# Patient Record
Sex: FEMALE | Race: ASIAN | NOT HISPANIC OR LATINO | URBAN - METROPOLITAN AREA
[De-identification: names, ages, dates, MRNs, and addresses within clinical notes are randomized per-mention and may not be internally consistent; named-entity substitution may affect disease eponyms.]

---

## 2023-09-05 ENCOUNTER — APPOINTMENT (OUTPATIENT)
Dept: RADIOLOGY | Facility: MEDICAL CENTER | Age: 43
DRG: 915 | End: 2023-09-05
Attending: STUDENT IN AN ORGANIZED HEALTH CARE EDUCATION/TRAINING PROGRAM
Payer: COMMERCIAL

## 2023-09-05 ENCOUNTER — HOSPITAL ENCOUNTER (INPATIENT)
Facility: MEDICAL CENTER | Age: 43
LOS: 2 days | DRG: 915 | End: 2023-09-07
Attending: STUDENT IN AN ORGANIZED HEALTH CARE EDUCATION/TRAINING PROGRAM | Admitting: INTERNAL MEDICINE
Payer: COMMERCIAL

## 2023-09-05 DIAGNOSIS — J96.00 ACUTE RESPIRATORY FAILURE, UNSPECIFIED WHETHER WITH HYPOXIA OR HYPERCAPNIA (HCC): ICD-10-CM

## 2023-09-05 DIAGNOSIS — T78.2XXA ANAPHYLAXIS, INITIAL ENCOUNTER: ICD-10-CM

## 2023-09-05 DIAGNOSIS — T78.2XXA ANAPHYLACTIC SHOCK: ICD-10-CM

## 2023-09-05 DIAGNOSIS — T78.09XA ANAPHYLACTIC REACTION DUE TO OTHER FOOD PRODUCTS, INITIAL ENCOUNTER: ICD-10-CM

## 2023-09-05 PROBLEM — F14.10 COCAINE ABUSE (HCC): Status: ACTIVE | Noted: 2023-09-05

## 2023-09-05 PROBLEM — J96.01 ACUTE RESPIRATORY FAILURE WITH HYPOXIA (HCC): Status: ACTIVE | Noted: 2023-09-05

## 2023-09-05 PROBLEM — R82.5 POSITIVE URINE DRUG SCREEN: Status: ACTIVE | Noted: 2023-09-05

## 2023-09-05 PROBLEM — E83.42 HYPOMAGNESEMIA: Status: ACTIVE | Noted: 2023-09-05

## 2023-09-05 PROBLEM — E87.6 HYPOKALEMIA: Status: ACTIVE | Noted: 2023-09-05

## 2023-09-05 LAB
ALBUMIN SERPL BCP-MCNC: 3.6 G/DL (ref 3.2–4.9)
ALBUMIN/GLOB SERPL: 1.4 G/DL
ALP SERPL-CCNC: 50 U/L (ref 30–99)
ALT SERPL-CCNC: 8 U/L (ref 2–50)
AMPHET UR QL SCN: NEGATIVE
ANION GAP SERPL CALC-SCNC: 10 MMOL/L (ref 7–16)
ARTERIAL PATENCY WRIST A: ABNORMAL
AST SERPL-CCNC: 17 U/L (ref 12–45)
BARBITURATES UR QL SCN: NEGATIVE
BASE EXCESS BLDA CALC-SCNC: -2 MMOL/L (ref -4–3)
BASE EXCESS BLDA CALC-SCNC: -2 MMOL/L (ref -4–3)
BASOPHILS # BLD AUTO: 0.1 % (ref 0–1.8)
BASOPHILS # BLD: 0.01 K/UL (ref 0–0.12)
BENZODIAZ UR QL SCN: POSITIVE
BILIRUB SERPL-MCNC: 0.6 MG/DL (ref 0.1–1.5)
BODY TEMPERATURE: ABNORMAL DEGREES
BODY TEMPERATURE: ABNORMAL DEGREES
BREATHS SETTING VENT: 22
BREATHS SETTING VENT: 22
BUN SERPL-MCNC: 8 MG/DL (ref 8–22)
BZE UR QL SCN: POSITIVE
CALCIUM ALBUM COR SERPL-MCNC: 8 MG/DL (ref 8.5–10.5)
CALCIUM SERPL-MCNC: 7.7 MG/DL (ref 8.5–10.5)
CANNABINOIDS UR QL SCN: POSITIVE
CHLORIDE SERPL-SCNC: 107 MMOL/L (ref 96–112)
CO2 BLDA-SCNC: 22 MMOL/L (ref 20–33)
CO2 BLDA-SCNC: 25 MMOL/L (ref 20–33)
CO2 SERPL-SCNC: 22 MMOL/L (ref 20–33)
CREAT SERPL-MCNC: 0.65 MG/DL (ref 0.5–1.4)
DELSYS IDSYS: ABNORMAL
DELSYS IDSYS: ABNORMAL
END TIDAL CARBON DIOXIDE IECO2: 27 MMHG
EOSINOPHIL # BLD AUTO: 0 K/UL (ref 0–0.51)
EOSINOPHIL NFR BLD: 0 % (ref 0–6.9)
ERYTHROCYTE [DISTWIDTH] IN BLOOD BY AUTOMATED COUNT: 46.2 FL (ref 35.9–50)
ETHANOL BLD-MCNC: <10.1 MG/DL
FENTANYL UR QL: POSITIVE
GFR SERPLBLD CREATININE-BSD FMLA CKD-EPI: 113 ML/MIN/1.73 M 2
GLOBULIN SER CALC-MCNC: 2.5 G/DL (ref 1.9–3.5)
GLUCOSE SERPL-MCNC: 137 MG/DL (ref 65–99)
HCG SERPL QL: NEGATIVE
HCO3 BLDA-SCNC: 21.1 MMOL/L (ref 17–25)
HCO3 BLDA-SCNC: 23.8 MMOL/L (ref 17–25)
HCT VFR BLD AUTO: 34.5 % (ref 37–47)
HGB BLD-MCNC: 11.9 G/DL (ref 12–16)
HOROWITZ INDEX BLDA+IHG-RTO: 353 MM[HG]
HOROWITZ INDEX BLDA+IHG-RTO: 433 MM[HG]
IMM GRANULOCYTES # BLD AUTO: 0.03 K/UL (ref 0–0.11)
IMM GRANULOCYTES NFR BLD AUTO: 0.3 % (ref 0–0.9)
LYMPHOCYTES # BLD AUTO: 0.31 K/UL (ref 1–4.8)
LYMPHOCYTES NFR BLD: 3.2 % (ref 22–41)
MAGNESIUM SERPL-MCNC: 1.9 MG/DL (ref 1.5–2.5)
MCH RBC QN AUTO: 32.7 PG (ref 27–33)
MCHC RBC AUTO-ENTMCNC: 34.5 G/DL (ref 32.2–35.5)
MCV RBC AUTO: 94.8 FL (ref 81.4–97.8)
METHADONE UR QL SCN: NEGATIVE
MODE IMODE: ABNORMAL
MODE IMODE: ABNORMAL
MONOCYTES # BLD AUTO: 0.07 K/UL (ref 0–0.85)
MONOCYTES NFR BLD AUTO: 0.7 % (ref 0–13.4)
NEUTROPHILS # BLD AUTO: 9.3 K/UL (ref 1.82–7.42)
NEUTROPHILS NFR BLD: 95.7 % (ref 44–72)
NRBC # BLD AUTO: 0 K/UL
NRBC BLD-RTO: 0 /100 WBC (ref 0–0.2)
O2/TOTAL GAS SETTING VFR VENT: 30 %
O2/TOTAL GAS SETTING VFR VENT: 30 %
OPIATES UR QL SCN: NEGATIVE
OXYCODONE UR QL SCN: NEGATIVE
PCO2 BLDA: 29.4 MMHG (ref 26–37)
PCO2 BLDA: 42.9 MMHG (ref 26–37)
PCO2 TEMP ADJ BLDA: 28.1 MMHG (ref 26–37)
PCO2 TEMP ADJ BLDA: 41.2 MMHG (ref 26–37)
PCP UR QL SCN: NEGATIVE
PEEP END EXPIRATORY PRESSURE IPEEP: 8 CMH20
PEEP END EXPIRATORY PRESSURE IPEEP: 8 CMH20
PH BLDA: 7.35 [PH] (ref 7.4–7.5)
PH BLDA: 7.46 [PH] (ref 7.4–7.5)
PH TEMP ADJ BLDA: 7.37 [PH] (ref 7.4–7.5)
PH TEMP ADJ BLDA: 7.48 [PH] (ref 7.4–7.5)
PHOSPHATE SERPL-MCNC: 2.5 MG/DL (ref 2.5–4.5)
PLATELET # BLD AUTO: 209 K/UL (ref 164–446)
PMV BLD AUTO: 9.3 FL (ref 9–12.9)
PO2 BLDA: 106 MMHG (ref 64–87)
PO2 BLDA: 130 MMHG (ref 64–87)
PO2 TEMP ADJ BLDA: 100 MMHG (ref 64–87)
PO2 TEMP ADJ BLDA: 124 MMHG (ref 64–87)
POTASSIUM SERPL-SCNC: 3.2 MMOL/L (ref 3.6–5.5)
PROPOXYPH UR QL SCN: NEGATIVE
PROT SERPL-MCNC: 6.1 G/DL (ref 6–8.2)
RBC # BLD AUTO: 3.64 M/UL (ref 4.2–5.4)
SAO2 % BLDA: 98 % (ref 93–99)
SAO2 % BLDA: 99 % (ref 93–99)
SODIUM SERPL-SCNC: 139 MMOL/L (ref 135–145)
SPECIMEN DRAWN FROM PATIENT: ABNORMAL
SPECIMEN DRAWN FROM PATIENT: ABNORMAL
TIDAL VOLUME IVT: 320 ML
TIDAL VOLUME IVT: 330 ML
WBC # BLD AUTO: 9.7 K/UL (ref 4.8–10.8)

## 2023-09-05 PROCEDURE — 80053 COMPREHEN METABOLIC PANEL: CPT

## 2023-09-05 PROCEDURE — 700105 HCHG RX REV CODE 258: Performed by: INTERNAL MEDICINE

## 2023-09-05 PROCEDURE — 71045 X-RAY EXAM CHEST 1 VIEW: CPT

## 2023-09-05 PROCEDURE — 80307 DRUG TEST PRSMV CHEM ANLYZR: CPT

## 2023-09-05 PROCEDURE — 36600 WITHDRAWAL OF ARTERIAL BLOOD: CPT

## 2023-09-05 PROCEDURE — 82803 BLOOD GASES ANY COMBINATION: CPT

## 2023-09-05 PROCEDURE — 770022 HCHG ROOM/CARE - ICU (200)

## 2023-09-05 PROCEDURE — 85025 COMPLETE CBC W/AUTO DIFF WBC: CPT

## 2023-09-05 PROCEDURE — 99292 CRITICAL CARE ADDL 30 MIN: CPT | Performed by: INTERNAL MEDICINE

## 2023-09-05 PROCEDURE — 700111 HCHG RX REV CODE 636 W/ 250 OVERRIDE (IP)

## 2023-09-05 PROCEDURE — 82077 ASSAY SPEC XCP UR&BREATH IA: CPT

## 2023-09-05 PROCEDURE — 43752 NASAL/OROGASTRIC W/TUBE PLMT: CPT

## 2023-09-05 PROCEDURE — 83735 ASSAY OF MAGNESIUM: CPT

## 2023-09-05 PROCEDURE — 96368 THER/DIAG CONCURRENT INF: CPT

## 2023-09-05 PROCEDURE — 94799 UNLISTED PULMONARY SVC/PX: CPT

## 2023-09-05 PROCEDURE — 96365 THER/PROPH/DIAG IV INF INIT: CPT

## 2023-09-05 PROCEDURE — 700111 HCHG RX REV CODE 636 W/ 250 OVERRIDE (IP): Mod: JZ | Performed by: INTERNAL MEDICINE

## 2023-09-05 PROCEDURE — 5A1935Z RESPIRATORY VENTILATION, LESS THAN 24 CONSECUTIVE HOURS: ICD-10-PCS | Performed by: INTERNAL MEDICINE

## 2023-09-05 PROCEDURE — 51702 INSERT TEMP BLADDER CATH: CPT

## 2023-09-05 PROCEDURE — 94002 VENT MGMT INPAT INIT DAY: CPT

## 2023-09-05 PROCEDURE — 96376 TX/PRO/DX INJ SAME DRUG ADON: CPT

## 2023-09-05 PROCEDURE — 84703 CHORIONIC GONADOTROPIN ASSAY: CPT

## 2023-09-05 PROCEDURE — 94003 VENT MGMT INPAT SUBQ DAY: CPT

## 2023-09-05 PROCEDURE — 84100 ASSAY OF PHOSPHORUS: CPT

## 2023-09-05 PROCEDURE — 96375 TX/PRO/DX INJ NEW DRUG ADDON: CPT

## 2023-09-05 PROCEDURE — 96366 THER/PROPH/DIAG IV INF ADDON: CPT

## 2023-09-05 PROCEDURE — 99291 CRITICAL CARE FIRST HOUR: CPT | Performed by: INTERNAL MEDICINE

## 2023-09-05 PROCEDURE — 303105 HCHG CATHETER EXTRA

## 2023-09-05 PROCEDURE — 700101 HCHG RX REV CODE 250: Performed by: INTERNAL MEDICINE

## 2023-09-05 PROCEDURE — 700111 HCHG RX REV CODE 636 W/ 250 OVERRIDE (IP): Mod: JZ | Performed by: STUDENT IN AN ORGANIZED HEALTH CARE EDUCATION/TRAINING PROGRAM

## 2023-09-05 PROCEDURE — 700105 HCHG RX REV CODE 258: Performed by: STUDENT IN AN ORGANIZED HEALTH CARE EDUCATION/TRAINING PROGRAM

## 2023-09-05 PROCEDURE — 96367 TX/PROPH/DG ADDL SEQ IV INF: CPT

## 2023-09-05 PROCEDURE — 99291 CRITICAL CARE FIRST HOUR: CPT

## 2023-09-05 PROCEDURE — 36415 COLL VENOUS BLD VENIPUNCTURE: CPT

## 2023-09-05 RX ORDER — POLYETHYLENE GLYCOL 3350 17 G/17G
1 POWDER, FOR SOLUTION ORAL
Status: DISCONTINUED | OUTPATIENT
Start: 2023-09-05 | End: 2023-09-05

## 2023-09-05 RX ORDER — DIPHENHYDRAMINE HYDROCHLORIDE 50 MG/ML
25 INJECTION INTRAMUSCULAR; INTRAVENOUS EVERY 6 HOURS
Status: DISCONTINUED | OUTPATIENT
Start: 2023-09-05 | End: 2023-09-06

## 2023-09-05 RX ORDER — MAGNESIUM SULFATE HEPTAHYDRATE 40 MG/ML
2 INJECTION, SOLUTION INTRAVENOUS ONCE
Status: COMPLETED | OUTPATIENT
Start: 2023-09-05 | End: 2023-09-05

## 2023-09-05 RX ORDER — MIDAZOLAM HYDROCHLORIDE 1 MG/ML
4 INJECTION INTRAMUSCULAR; INTRAVENOUS ONCE
Status: COMPLETED | OUTPATIENT
Start: 2023-09-05 | End: 2023-09-05

## 2023-09-05 RX ORDER — PROMETHAZINE HYDROCHLORIDE 25 MG/1
12.5-25 TABLET ORAL EVERY 4 HOURS PRN
Status: DISCONTINUED | OUTPATIENT
Start: 2023-09-05 | End: 2023-09-06

## 2023-09-05 RX ORDER — BISACODYL 10 MG
10 SUPPOSITORY, RECTAL RECTAL
Status: DISCONTINUED | OUTPATIENT
Start: 2023-09-05 | End: 2023-09-05

## 2023-09-05 RX ORDER — METHYLPREDNISOLONE SODIUM SUCCINATE 125 MG/2ML
62.5 INJECTION, POWDER, LYOPHILIZED, FOR SOLUTION INTRAMUSCULAR; INTRAVENOUS EVERY 6 HOURS
Status: DISCONTINUED | OUTPATIENT
Start: 2023-09-05 | End: 2023-09-06

## 2023-09-05 RX ORDER — POLYETHYLENE GLYCOL 3350 17 G/17G
1 POWDER, FOR SOLUTION ORAL
Status: DISCONTINUED | OUTPATIENT
Start: 2023-09-05 | End: 2023-09-06

## 2023-09-05 RX ORDER — POTASSIUM CHLORIDE 7.45 MG/ML
10 INJECTION INTRAVENOUS
Status: COMPLETED | OUTPATIENT
Start: 2023-09-05 | End: 2023-09-05

## 2023-09-05 RX ORDER — HYDRALAZINE HYDROCHLORIDE 20 MG/ML
20 INJECTION INTRAMUSCULAR; INTRAVENOUS EVERY 6 HOURS PRN
Status: DISCONTINUED | OUTPATIENT
Start: 2023-09-05 | End: 2023-09-07 | Stop reason: HOSPADM

## 2023-09-05 RX ORDER — SODIUM CHLORIDE, SODIUM LACTATE, POTASSIUM CHLORIDE, CALCIUM CHLORIDE 600; 310; 30; 20 MG/100ML; MG/100ML; MG/100ML; MG/100ML
INJECTION, SOLUTION INTRAVENOUS CONTINUOUS
Status: DISCONTINUED | OUTPATIENT
Start: 2023-09-05 | End: 2023-09-06

## 2023-09-05 RX ORDER — HYDROMORPHONE HYDROCHLORIDE 1 MG/ML
.5-2 INJECTION, SOLUTION INTRAMUSCULAR; INTRAVENOUS; SUBCUTANEOUS
Status: DISCONTINUED | OUTPATIENT
Start: 2023-09-05 | End: 2023-09-06

## 2023-09-05 RX ORDER — FAMOTIDINE 20 MG/1
20 TABLET, FILM COATED ORAL EVERY 12 HOURS
Status: DISCONTINUED | OUTPATIENT
Start: 2023-09-05 | End: 2023-09-06

## 2023-09-05 RX ORDER — CALCIUM GLUCONATE 20 MG/ML
1 INJECTION, SOLUTION INTRAVENOUS ONCE
Status: COMPLETED | OUTPATIENT
Start: 2023-09-05 | End: 2023-09-05

## 2023-09-05 RX ORDER — SODIUM CHLORIDE, SODIUM LACTATE, POTASSIUM CHLORIDE, CALCIUM CHLORIDE 600; 310; 30; 20 MG/100ML; MG/100ML; MG/100ML; MG/100ML
INJECTION, SOLUTION INTRAVENOUS CONTINUOUS
Status: DISCONTINUED | OUTPATIENT
Start: 2023-09-05 | End: 2023-09-05

## 2023-09-05 RX ORDER — AMOXICILLIN 250 MG
2 CAPSULE ORAL 2 TIMES DAILY
Status: DISCONTINUED | OUTPATIENT
Start: 2023-09-05 | End: 2023-09-05

## 2023-09-05 RX ORDER — FAMOTIDINE 20 MG/1
20 TABLET, FILM COATED ORAL EVERY 12 HOURS
Status: DISCONTINUED | OUTPATIENT
Start: 2023-09-05 | End: 2023-09-05

## 2023-09-05 RX ORDER — AMOXICILLIN 250 MG
2 CAPSULE ORAL 2 TIMES DAILY
Status: DISCONTINUED | OUTPATIENT
Start: 2023-09-05 | End: 2023-09-06

## 2023-09-05 RX ORDER — ONDANSETRON 4 MG/1
4 TABLET, ORALLY DISINTEGRATING ORAL EVERY 4 HOURS PRN
Status: DISCONTINUED | OUTPATIENT
Start: 2023-09-05 | End: 2023-09-05

## 2023-09-05 RX ORDER — ENOXAPARIN SODIUM 100 MG/ML
40 INJECTION SUBCUTANEOUS DAILY
Status: DISCONTINUED | OUTPATIENT
Start: 2023-09-05 | End: 2023-09-06

## 2023-09-05 RX ORDER — PROMETHAZINE HYDROCHLORIDE 25 MG/1
12.5-25 TABLET ORAL EVERY 4 HOURS PRN
Status: DISCONTINUED | OUTPATIENT
Start: 2023-09-05 | End: 2023-09-05

## 2023-09-05 RX ORDER — IPRATROPIUM BROMIDE AND ALBUTEROL SULFATE 2.5; .5 MG/3ML; MG/3ML
3 SOLUTION RESPIRATORY (INHALATION)
Status: DISCONTINUED | OUTPATIENT
Start: 2023-09-05 | End: 2023-09-07 | Stop reason: HOSPADM

## 2023-09-05 RX ORDER — ONDANSETRON 4 MG/1
4 TABLET, ORALLY DISINTEGRATING ORAL EVERY 4 HOURS PRN
Status: DISCONTINUED | OUTPATIENT
Start: 2023-09-05 | End: 2023-09-06

## 2023-09-05 RX ORDER — PROMETHAZINE HYDROCHLORIDE 25 MG/1
12.5-25 SUPPOSITORY RECTAL EVERY 4 HOURS PRN
Status: DISCONTINUED | OUTPATIENT
Start: 2023-09-05 | End: 2023-09-07 | Stop reason: HOSPADM

## 2023-09-05 RX ORDER — DEXMEDETOMIDINE HYDROCHLORIDE 4 UG/ML
0-1.5 INJECTION, SOLUTION INTRAVENOUS CONTINUOUS
Status: DISCONTINUED | OUTPATIENT
Start: 2023-09-05 | End: 2023-09-06

## 2023-09-05 RX ORDER — POTASSIUM CHLORIDE 7.45 MG/ML
10 INJECTION INTRAVENOUS ONCE
Status: COMPLETED | OUTPATIENT
Start: 2023-09-05 | End: 2023-09-05

## 2023-09-05 RX ORDER — LABETALOL HYDROCHLORIDE 5 MG/ML
10-20 INJECTION, SOLUTION INTRAVENOUS EVERY 4 HOURS PRN
Status: DISCONTINUED | OUTPATIENT
Start: 2023-09-05 | End: 2023-09-07 | Stop reason: HOSPADM

## 2023-09-05 RX ORDER — MIDAZOLAM HYDROCHLORIDE 1 MG/ML
2 INJECTION INTRAMUSCULAR; INTRAVENOUS ONCE
Status: COMPLETED | OUTPATIENT
Start: 2023-09-05 | End: 2023-09-05

## 2023-09-05 RX ORDER — BISACODYL 10 MG
10 SUPPOSITORY, RECTAL RECTAL
Status: DISCONTINUED | OUTPATIENT
Start: 2023-09-05 | End: 2023-09-06

## 2023-09-05 RX ORDER — PROCHLORPERAZINE EDISYLATE 5 MG/ML
5-10 INJECTION INTRAMUSCULAR; INTRAVENOUS EVERY 4 HOURS PRN
Status: DISCONTINUED | OUTPATIENT
Start: 2023-09-05 | End: 2023-09-07 | Stop reason: HOSPADM

## 2023-09-05 RX ORDER — MIDAZOLAM HYDROCHLORIDE 1 MG/ML
INJECTION INTRAMUSCULAR; INTRAVENOUS
Status: COMPLETED
Start: 2023-09-05 | End: 2023-09-05

## 2023-09-05 RX ORDER — ONDANSETRON 2 MG/ML
4 INJECTION INTRAMUSCULAR; INTRAVENOUS EVERY 4 HOURS PRN
Status: DISCONTINUED | OUTPATIENT
Start: 2023-09-05 | End: 2023-09-07 | Stop reason: HOSPADM

## 2023-09-05 RX ADMIN — DIPHENHYDRAMINE HYDROCHLORIDE 25 MG: 50 INJECTION, SOLUTION INTRAMUSCULAR; INTRAVENOUS at 13:08

## 2023-09-05 RX ADMIN — POTASSIUM CHLORIDE 10 MEQ: 7.46 INJECTION, SOLUTION INTRAVENOUS at 12:10

## 2023-09-05 RX ADMIN — FAMOTIDINE 20 MG: 10 INJECTION, SOLUTION INTRAVENOUS at 08:37

## 2023-09-05 RX ADMIN — SODIUM CHLORIDE, POTASSIUM CHLORIDE, SODIUM LACTATE AND CALCIUM CHLORIDE: 600; 310; 30; 20 INJECTION, SOLUTION INTRAVENOUS at 05:30

## 2023-09-05 RX ADMIN — SODIUM CHLORIDE, POTASSIUM CHLORIDE, SODIUM LACTATE AND CALCIUM CHLORIDE: 600; 310; 30; 20 INJECTION, SOLUTION INTRAVENOUS at 15:17

## 2023-09-05 RX ADMIN — SODIUM CHLORIDE, POTASSIUM CHLORIDE, SODIUM LACTATE AND CALCIUM CHLORIDE: 600; 310; 30; 20 INJECTION, SOLUTION INTRAVENOUS at 09:21

## 2023-09-05 RX ADMIN — METHYLPREDNISOLONE SODIUM SUCCINATE 62.5 MG: 125 INJECTION, POWDER, FOR SOLUTION INTRAMUSCULAR; INTRAVENOUS at 13:08

## 2023-09-05 RX ADMIN — POTASSIUM CHLORIDE 10 MEQ: 7.46 INJECTION, SOLUTION INTRAVENOUS at 07:53

## 2023-09-05 RX ADMIN — POTASSIUM CHLORIDE 10 MEQ: 7.46 INJECTION, SOLUTION INTRAVENOUS at 09:58

## 2023-09-05 RX ADMIN — FAMOTIDINE 20 MG: 10 INJECTION, SOLUTION INTRAVENOUS at 05:33

## 2023-09-05 RX ADMIN — MAGNESIUM SULFATE HEPTAHYDRATE 2 G: 2 INJECTION, SOLUTION INTRAVENOUS at 09:47

## 2023-09-05 RX ADMIN — CALCIUM GLUCONATE 1 G: 20 INJECTION, SOLUTION INTRAVENOUS at 07:47

## 2023-09-05 RX ADMIN — METHYLPREDNISOLONE SODIUM SUCCINATE 62.5 MG: 125 INJECTION, POWDER, FOR SOLUTION INTRAMUSCULAR; INTRAVENOUS at 08:38

## 2023-09-05 RX ADMIN — POTASSIUM CHLORIDE 10 MEQ: 7.46 INJECTION, SOLUTION INTRAVENOUS at 09:08

## 2023-09-05 RX ADMIN — POTASSIUM CHLORIDE 10 MEQ: 7.46 INJECTION, SOLUTION INTRAVENOUS at 10:59

## 2023-09-05 RX ADMIN — MIDAZOLAM 2 MG: 1 INJECTION, SOLUTION INTRAMUSCULAR; INTRAVENOUS at 05:30

## 2023-09-05 RX ADMIN — METHYLPREDNISOLONE SODIUM SUCCINATE 62.5 MG: 125 INJECTION, POWDER, FOR SOLUTION INTRAMUSCULAR; INTRAVENOUS at 23:13

## 2023-09-05 RX ADMIN — FAMOTIDINE 20 MG: 10 INJECTION, SOLUTION INTRAVENOUS at 17:25

## 2023-09-05 RX ADMIN — METHYLPREDNISOLONE SODIUM SUCCINATE 62.5 MG: 125 INJECTION, POWDER, FOR SOLUTION INTRAMUSCULAR; INTRAVENOUS at 17:22

## 2023-09-05 RX ADMIN — SODIUM CHLORIDE, POTASSIUM CHLORIDE, SODIUM LACTATE AND CALCIUM CHLORIDE: 600; 310; 30; 20 INJECTION, SOLUTION INTRAVENOUS at 23:25

## 2023-09-05 RX ADMIN — DIPHENHYDRAMINE HYDROCHLORIDE 25 MG: 50 INJECTION, SOLUTION INTRAMUSCULAR; INTRAVENOUS at 17:25

## 2023-09-05 RX ADMIN — DIPHENHYDRAMINE HYDROCHLORIDE 25 MG: 50 INJECTION, SOLUTION INTRAMUSCULAR; INTRAVENOUS at 23:14

## 2023-09-05 RX ADMIN — PROPOFOL 15 MCG/KG/MIN: 10 INJECTION, EMULSION INTRAVENOUS at 05:23

## 2023-09-05 RX ADMIN — DEXMEDETOMIDINE HYDROCHLORIDE 0.2 MCG/KG/HR: 100 INJECTION, SOLUTION INTRAVENOUS at 09:06

## 2023-09-05 RX ADMIN — MIDAZOLAM HYDROCHLORIDE 2 MG: 1 INJECTION INTRAMUSCULAR; INTRAVENOUS at 05:30

## 2023-09-05 RX ADMIN — DIPHENHYDRAMINE HYDROCHLORIDE 25 MG: 50 INJECTION, SOLUTION INTRAMUSCULAR; INTRAVENOUS at 09:51

## 2023-09-05 RX ADMIN — ENOXAPARIN SODIUM 40 MG: 100 INJECTION SUBCUTANEOUS at 17:22

## 2023-09-05 ASSESSMENT — PAIN DESCRIPTION - PAIN TYPE
TYPE: ACUTE PAIN
TYPE: ACUTE PAIN

## 2023-09-05 ASSESSMENT — FIBROSIS 4 INDEX: FIB4 SCORE: 1.18

## 2023-09-05 NOTE — H&P
PULMONARY AND CRITICAL CARE MEDICINE HISTORY AND PHYSICAL EXAMINATION    Date of Admission:  9/5/2023    Admitting Physician:  Abhinav Agee MD    Reason for Admission:  Anaphylactic  shock    Chief Complaint:  Anaphylactic shock    History of Present Illness:    I was kindly asked to see and evaluate Carlita Barker, a 43 y.o. female for evaluation and management of the above problem.    The entire history is obtained from healthcare providers and the medical record as this lady cannot provide me with any history.  This lady presented to the medical facility at [facility] with facial swelling and tongue swelling.  Apparently she administered her own EpiPen's and Benadryl prior to presenting to the medical facility.  She was diagnosed with anaphylaxis and received additional epinephrine, intravenous methylprednisolone and intravenous diphenhydramine.  She was intubated for airway protection and sedated with 20 mg of intravenous midazolam.  An epinephrine infusion was initiated for hypotension.  She was transported to [facility].    Medications Prior to Admission:    No current facility-administered medications on file prior to encounter.     No current outpatient medications on file prior to encounter.       Current Medications:      Current Facility-Administered Medications:     potassium chloride (Kcl) ivpb 10 mEq, 10 mEq, Intravenous, Once, Anthony Clya M.D., Last Rate: 100 mL/hr at 09/05/23 0753, 10 mEq at 09/05/23 0753    senna-docusate (Pericolace Or Senokot S) 8.6-50 MG per tablet 2 Tablet, 2 Tablet, Oral, BID **AND** polyethylene glycol/lytes (Miralax) PACKET 1 Packet, 1 Packet, Oral, QDAY PRN **AND** magnesium hydroxide (Milk Of Magnesia) suspension 30 mL, 30 mL, Oral, QDAY PRN **AND** bisacodyl (Dulcolax) suppository 10 mg, 10 mg, Rectal, QDAY PRN, Abhinav Agee M.D.    lactated ringers infusion, , Intravenous, Continuous, Abhinav Agee M.D.    enoxaparin (Lovenox) inj 40 mg, 40  mg, Subcutaneous, DAILY AT 1800, Abhinav Agee M.D.    ondansetron (Zofran) syringe/vial injection 4 mg, 4 mg, Intravenous, Q4HRS PRN, Abhinav Agee M.D.    ondansetron (Zofran ODT) dispertab 4 mg, 4 mg, Oral, Q4HRS PRN, Abhinav Agee M.D.    promethazine (Phenergan) tablet 12.5-25 mg, 12.5-25 mg, Oral, Q4HRS PRN, Abhinav Agee M.D.    promethazine (Phenergan) suppository 12.5-25 mg, 12.5-25 mg, Rectal, Q4HRS PRN, Abhinav Agee M.D.    prochlorperazine (Compazine) injection 5-10 mg, 5-10 mg, Intravenous, Q4HRS PRN, Abhinav Agee M.D.    Respiratory Therapy Consult, , Nebulization, Continuous RT, Abhinav Agee M.D.    famotidine (Pepcid) tablet 20 mg, 20 mg, Enteral Tube, Q12HRS **OR** famotidine (Pepcid) injection 20 mg, 20 mg, Intravenous, Q12HRS, Abhinav Agee M.D. MD Alert...ICU Electrolyte Replacement per Pharmacy, , Other, PHARMACY TO DOSE, Abhinav Agee M.D.    lidocaine (Xylocaine) 1 % injection 2 mL, 2 mL, Tracheal Tube, Q30 MIN PRN, Abhinav Agee M.D.    dexmedetomidine (PRECEDEX) 400 mcg/100mL NS premix infusion, 0-1.5 mcg/kg/hr (Ideal), Intravenous, Continuous, Abhinav Agee M.D.    ipratropium-albuterol (DUONEB) nebulizer solution, 3 mL, Nebulization, Q2HRS PRN (RT), Abhinav Agee M.D.    HYDROmorphone (Dilaudid) injection 0.5-2 mg, 0.5-2 mg, Intravenous, Q HOUR PRN, Abhinav Agee M.D.    magnesium sulfate IVPB premix 2 g, 2 g, Intravenous, Once, Abhinav Agee M.D.    potassium chloride (Kcl) ivpb 10 mEq, 10 mEq, Intravenous, Q HOUR, Abhinav Agee M.D.    methylPREDNISolone sod succ (SOLU-MEDROL) 125 MG injection 62.5 mg, 62.5 mg, Intravenous, Q6HRS, Abhinav Agee M.D.    diphenhydrAMINE (Benadryl) injection 25 mg, 25 mg, Intravenous, Q6HRS, Abhinav Agee M.D.  No current outpatient medications on file.    Allergies:    Patient has no allergy  "information on record.    Past Surgical History:    No past surgical history on file.    Past Medical History:    No past medical history on file.    Social History:    Social History     Socioeconomic History    Marital status: Unknown     Spouse name: Not on file    Number of children: Not on file    Years of education: Not on file    Highest education level: Not on file   Occupational History    Not on file   Tobacco Use    Smoking status: Not on file    Smokeless tobacco: Not on file   Substance and Sexual Activity    Alcohol use: Not on file    Drug use: Not on file    Sexual activity: Not on file   Other Topics Concern    Not on file   Social History Narrative    Not on file     Social Determinants of Health     Financial Resource Strain: Not on file   Food Insecurity: Not on file   Transportation Needs: Not on file   Physical Activity: Not on file   Stress: Not on file   Social Connections: Not on file   Intimate Partner Violence: Not on file   Housing Stability: Not on file       Family History:    No family history on file.    Review of System:    Review of Systems   Unable to perform ROS: Acuity of condition       Physical Examination:    BP (!) 149/108   Pulse 66   Resp (!) 22   Ht 1.6 m (5' 3\")   Wt 50 kg (110 lb 3.7 oz)   SpO2 100%   BMI 19.53 kg/m²   Physical Exam  Constitutional:       Comments: On ventilator   HENT:      Head:      Comments: Significant periorbital edema.  Very mild tongue swelling.  Her lips appear normal.     Mouth/Throat:      Pharynx: Oropharynx is clear.   Cardiovascular:      Comments: Sinus rhythm  Pulmonary:      Breath sounds: No wheezing or rales.   Abdominal:      General: There is no distension.      Tenderness: There is no abdominal tenderness.   Musculoskeletal:      Right lower leg: No edema.      Left lower leg: No edema.   Skin:     General: Skin is warm.      Capillary Refill: Capillary refill takes less than 2 seconds.   Neurological:      Comments: Sedated " "        Laboratory Data:    Recent Labs     09/05/23  0555   ISTATAPH 7.353*   ISTATAPCO2 42.9*   ISTATAPO2 106*   ISTATATCO2 25   POHBLAN0SBV 98   ISTATARTHCO3 23.8   ISTATARTBE -2   ISTATTEMP 36.1 C   ISTATFIO2 30   ISTATSPEC Arterial   ISTATAPHTC 7.366*   YLWADCLR7SB 100*     Recent Labs     09/05/23  0626   WBC 9.7   RBC 3.64*   HEMOGLOBIN 11.9*   HEMATOCRIT 34.5*   MCV 94.8   MCH 32.7   MCHC 34.5   RDW 46.2   PLATELETCT 209   MPV 9.3     Recent Labs     09/05/23  0626   SODIUM 139   POTASSIUM 3.2*   CHLORIDE 107   CO2 22   GLUCOSE 137*   BUN 8   CREATININE 0.65   CALCIUM 7.7*             Recent Labs     09/05/23  0626   METHADONE Negative   OPIATES Negative   CANNABINOID Positive*   AMPHUR Negative       Imaging:    I personally viewed all the available CXR and CT scan images as well as reviewed the radiology interpretation reports.    DX-CHEST-PORTABLE (1 VIEW)   Final Result      ETT and nasogastric tube appear adequate. Mild lung scarring          Assessment and Plan:    * Anaphylactic shock- (present on admission)  Assessment & Plan  Characterized by hypotension with facial and tongue swelling at [facility]  Treated with epinephrine, diphenhydramine prior to arrival at [facility]  Shock has resolved - epinephrine infusion discontinued  Methylprednisolone, 62.5 mg IV every 6 hours  Diphenhydramine, 25 mg IV every 6 hours  Famotidine, 20 mg IV every 12 hours    Acute respiratory failure with hypoxia (HCC)  Assessment & Plan  Intubated 9/5 at [facility] for airway protection  ABCDEF bundle  SAT/SBT as appropriate  Mobility level 1 for now    Positive urine drug screen  Assessment & Plan  Positive urine drug screen for benzodiazepines (which was administered by health care providers prior to urine specimen obtained), cocaine and fentanyl  Unclear if she intended to take fentanyl or if it was \"slipped\" to her    Cocaine abuse (HCC)  Assessment & Plan  Positive urine drug screen for cocaine  Cessation counseling " when clinically appropriate    Hypomagnesemia  Assessment & Plan  Replete magnesium    Hypokalemia  Assessment & Plan  Replete potassium      I have assessed and reassessed her respiratory status with ventilator adjustments, airway mechanics, ventilator waveforms, blood pressure, hemodynamics, cardiovascular status and her neurologic status.  She is at increased risk for worsening respiratory and cardiovascular system dysfunction.    High risk of deterioration and worsening vital organ dysfunction and death without the above critical care interventions.    The patient is critically ill.  Critical care time = 105 minutes in directly providing and coordinating critical care and extensive data review.  No time overlap and excludes procedures.    Discussed with ER physician, RN, RT, [facility] internal medicine resident    Abhinav Agee MD  Pulmonary and Critical Care Medicine

## 2023-09-05 NOTE — ED NOTES
PT IS REMAINS SEDATED ON VENT. EQUAL RISE AND FALL OF CHEST, SKIN IS WARM AND DRY, VSS, NAD, WILL CONTINUE TO MONITOR. PENDING ICU ADMISSION.

## 2023-09-05 NOTE — ASSESSMENT & PLAN NOTE
Intubated 9/5 at [facility] for airway protection  -Titrate FiO2 to keep sats greater than 90%   -Titrate ventilator prescription to optimize acid-base balance, oxygenation and ventilation  -HOB > 30 degrees and peridex for VAP prevention  -daily SBT  -Pepcid for GI prophylaxis  -SAT/SBT when able (ABCDEF Bundle)  -Early mobility  -SCDs and Lovenox  for DVT prophylaxis  -Pepcid, chlorhexidine  -Repeat chest xray and ABG PRN   -Titration of ventilator therapy based on ABGs and patient's status  -RT/O2 protocols  -Daily and PRN ABGs  -Sedation as tolerated/indicated

## 2023-09-05 NOTE — ED TRIAGE NOTES
Chief Complaint   Patient presents with    Sent by [facility]-flight from [facility], pt was experiencing Anaphylaxis, received 3x epi pen, Solumedrol and 50mg Benadryl IV. Pt intubated in field, with 20mg of versed. An epi gtt was started but paused 5 minutes PTA.     Allergic Reaction

## 2023-09-05 NOTE — ASSESSMENT & PLAN NOTE
-secondary to anaphylactic shock   -intubated 9/5 at [facility]   -Admit to ICU

## 2023-09-05 NOTE — ED PROVIDER NOTES
ED Provider Note    CHIEF COMPLAINT  Chief Complaint   Patient presents with    Sent by [facility]flight from [facility], pt was experiencing Anaphylaxis, received 3x epi pen, Solumedrol and 50mg Benadryl IV. Pt intubated in field, with 20mg of versed. An epi gtt was started but paused 5 minutes PTA.     Allergic Reaction       EXTERNAL RECORDS REVIEWED  EMS run sheet stable vitals in route    HPI/ROS  LIMITATION TO HISTORY   Select: Patient sedated, intubated  OUTSIDE HISTORIAN(S):  Dr. Bajwa, outside physician who reports patient had facial swelling, tongue swelling, despite 3 EpiPen's, Benadryl, was intubated for airway protection, started on epinephrine drip at 5 mics, per care flight, patient's blood pressure responded well, discontinue epinephrine approximately 20 minutes prior to arrival last systolic in the 20s, received 20 mg of Versed IV just prior to transport.    Carlita Barker is a 43 y.o. female who presents with anaphylaxis/anaphylactic shock, unknown trigger, patient was at [facility], she used her EpiPen's, took p.o. Benadryl, without improvement of symptoms presented to their outside hospital, where she was intubated for airway protection.  Reported trigger, had a previous episode but did not require intubation, reportedly idiopathic.    PAST MEDICAL HISTORY       SURGICAL HISTORY  patient denies any surgical history    FAMILY HISTORY  No family history on file.    SOCIAL HISTORY  Social History     Tobacco Use    Smoking status: Not on file    Smokeless tobacco: Not on file   Substance and Sexual Activity    Alcohol use: Not on file    Drug use: Not on file    Sexual activity: Not on file       CURRENT MEDICATIONS  Home Medications       Reviewed by Philippe Robertson (Pharmacy Tech) on 09/05/23 at 0545  Med List Status: Unable to Obtain     Medication Last Dose Status        Patient Graham Taking any Medications                           ALLERGIES  Not on File    PHYSICAL EXAM  VITAL  "SIGNS: BP (!) 127/91   Pulse 71   Resp (!) 22   Ht 1.6 m (5' 3\")   Wt 50 kg (110 lb 3.7 oz)   SpO2 100%   BMI 19.53 kg/m²    General: Petite female, sedated, intubated.  Upon initial arrival, patient moving all extremities, slightly agitated.  Head: Normocephalic atraumatic  Eyes: Extraocular motion intact.  There is significant periorbital swelling bilaterally.  Pupils are 2 mm, sluggishly reactive.  Neck: Supple, no rigidity  Cardiovascular: Regular rate and rhythm no murmurs rubs or gallops  Respiratory: Clear to auscultation bilaterally, equal chest rise and fall, mechanical breath sounds   Abdomen: Soft nontender no guarding  Musculoskeletal: Warm and well perfused, no peripheral edema  Neuro: GCS Q5V1WM6 (7), moving all extremities  Integumentary:  scattered superficial abrasions to lower extremities.      DIAGNOSTIC STUDIES / PROCEDURES      LABS  Anemia, unclear if chronic, mild hypokalemia, mild hypocalcemia, unclear etiology.  Blood gas is overall reassuring, titrate SPO2 accordingly, hCG negative.      RADIOLOGY  I have independently interpreted the diagnostic imaging associated with this visit and am waiting the final reading from the radiologist.   My preliminary interpretation is as follows: ET tube and OG in appropriate position, no pneumothorax no aspiration  Radiologist interpretation:   DX-CHEST-PORTABLE (1 VIEW)   Final Result      ETT and nasogastric tube appear adequate. Mild lung scarring            COURSE & MEDICAL DECISION MAKING    ED Observation Status? No; Patient does not meet criteria for ED Observation.     INITIAL ASSESSMENT, COURSE AND PLAN  Care Narrative: 43-year-old female with history of reported anaphylaxis presented to [facility] emergency department with facial swelling, symptoms of anaphylaxis, use multiple EpiPen's, without improvement, Benadryl without improvement, ultimately placed on epi drip, intubated for airway protection.    Unable to obtain further history " given patient's intubated status, initiated sedation, will check blood gas, obtain chest x-ray to confirm tube placement, place Mendez, OG tube, obtain baseline laboratory tests patient's blood pressure with systolic 180.  Currently without epinephrine, will defer additional pressors at this time.  Patient has adequate peripheral access with 2 large-bore IVs peripherally.  Ventilator settings titrated per ideal body weight.        HYDRATION: Based on the patient's presentation of Other p.o. status, intubated the patient was given IV fluids.    Fluid repletion initiated, ventilator titrated.    DISPOSITION AND DISCUSSIONS  I have discussed management of the patient with the following physicians and MALICK's: Dr. Agee    Discussion of management with other [facility] or appropriate source(s): RT titrating vent          FINAL DIAGNOSIS  1. Anaphylaxis, initial encounter    2. Anaphylactic shock    3. Acute respiratory failure, unspecified whether with hypoxia or hypercapnia (HCC)      CRITICAL CARE  The very real possibilty of a deterioration of this patient's condition required the highest level of my preparedness for sudden, emergent intervention.  I provided critical care services, which included medication orders, frequent reevaluations of the patient's condition and response to treatment, ordering and reviewing test results, and discussing the case with various consultants.  The critical care time associated with the care of the patient was 31 minutes. Review chart for interventions. This time is exclusive of any other billable procedures.          Electronically signed by: Anthony Clay M.D., 9/5/2023 5:06 AM

## 2023-09-05 NOTE — ED NOTES
RECEIVED BEDSIDE REPORT FROM JC DEL VALLE    PT IS SEDATED IN ROOM WITH TUBE IN PLACE. PT ON VENT. PT WITH AGOSTO TO GRAVITY. PT WITH EVEN RISE AND FALL OF CHEST. IVF INFUSING. WILL CONTINUE TO MONITOR.

## 2023-09-05 NOTE — HOSPITAL COURSE
Carlita Barker is a 43-year-old female without a significant past medical history who was transported from [facility] via EMS to [facility] 9/5/2023 for facial and tongue swelling/anaphylaxis.  It was reported that she self-administered her own EpiPenS x3, and Benadryl without any improvement therefore she was evaluated at the clinic at [facility].  At the clinic she was diagnosed with anaphylaxis and given additional epinephrine, IV methylprednisolone, IV Benadryl and ultimately required intubation for airway protection.  An epinephrine infusion was initiated for hypotension prior to transport.  Upon arrival to [facility] she was found to be hypertensive, hypokalemic, UDS positive for benzo, cannabis, fentanyl, cocaine.  She was transferred to ICU for critical care management

## 2023-09-05 NOTE — PROGRESS NOTES
4 Eyes Skin Assessment Completed by JC Montgomery and JC Hartley.    Head WDL  Ears WDL  Nose WDL  Mouth WDL  Neck WDL  Breast/Chest WDL  Shoulder Blades WDL  Spine WDL  (R) Arm/Elbow/Hand Abrasion  (L) Arm/Elbow/Hand Abrasion  Abdomen WDL  Groin WDL  Scrotum/Coccyx/Buttocks WDL  (R) Leg Abrasion  (L) Leg Abrasion  (R) Heel/Foot/Toe WDL  (L) Heel/Foot/Toe Redness          Devices In Places ECG, Blood Pressure Cuff, Pulse Ox, Mendez, SCD's, and ET Tube      Interventions In Place Pillows, Q2 Turns, and Low Air Loss Mattress    Possible Skin Injury No    Pictures Uploaded Into Epic N/A  Wound Consult Placed N/A  RN Wound Prevention Protocol Ordered No

## 2023-09-05 NOTE — ASSESSMENT & PLAN NOTE
"Positive urine drug screen for benzodiazepines (which was administered by health care providers prior to urine specimen obtained), cocaine and fentanyl  Unclear if she intended to take fentanyl or if it was \"slipped\" to her  "

## 2023-09-05 NOTE — ED NOTES
Bedside report given to JC Shields. Pt connected to cardiac monitor, ventilator. Pt is sedated per MAR.

## 2023-09-05 NOTE — DISCHARGE PLANNING
Case Management Discharge Planning    Admission Date: 9/5/2023  GMLOS:    ALOS: 0    6-Clicks ADL Score:    6-Clicks Mobility Score:    Therapy orders not indicated at this time    Anticipated Discharge Dispo: Discharge Disposition: Discharged to home/self care (01)    Per chart review pt resides in unknown    Family support:  Tiff Barker Father   [phone]   Cora Barker Mother   [phone]     Current Insurance on file: none on file    Last charted orientation status: VIN (intubated)    DME Needed: pending hospital course    Action(s) Taken: chart reviewed    Escalations Completed: [facility] [phone]; for pt information    Medically Clear: No    Next Steps: f/u with pt and medical team to discuss dc needs and barriers.  Assessment to be completed to assess for HCM needs.    Barriers to Discharge: Medical clearance /Specialty Clearance    Is the patient up for discharge tomorrow: No    RNCM PC to [facility] MD; reportedly pt has a history of spontaneous anaphylaxis. MD stated sister was with her at [facility] this morning and is making her way to [place]. MD did not take down sister's information.    Care Transition Team Assessment    Information Source  Orientation Level: Unable to assess  Information Given By: Parent  Who is responsible for making decisions for patient? : Patient    Readmission Evaluation  Is this a readmission?: No    Elopement Risk  Legal Hold: No  Ambulatory or Self Mobile in Wheelchair: No-Not an Elopement Risk  Elopement Risk: Not at Risk for Elopement    Interdisciplinary Discharge Planning  Does Admitting Nurse Feel This Could be a Complex Discharge?: No  Support Systems: Parent, Family Member(s)  Able to Return to Previous ADL's: Yes  Mobility Issues: No  Prior Services: Home-Independent  Durable Medical Equipment: Not Applicable    Discharge Preparedness  What is your plan after discharge?: Home with help  What are your discharge supports?: Sibling, Parent  Prior  Functional Level: Independent with Activities of Daily Living, Independent with Medication Management, Drives Self  Difficulity with ADLs: None  Difficulity with IADLs: None    Functional Assesment  Prior Functional Level: Independent with Activities of Daily Living, Independent with Medication Management, Drives Self    Finances  Financial Barriers to Discharge: No  Prescription Coverage: Yes    Advance Directive  Advance Directive?: None    Psychological Assessment  History of Substance Abuse: Cocaine  History of Psychiatric Problems: No    Discharge Risks or Barriers  Discharge risks or barriers?: Complex medical needs, Other (comment[place]  Patient risk factors: Complex medical needs, Other (comment) (lives out of state)    Anticipated Discharge Information  Discharge Disposition: Discharged to home/self care (01)

## 2023-09-05 NOTE — ASSESSMENT & PLAN NOTE
-Sx of facial and tongue swelling at [facility]  -Treated with 3x epinephrine, epinephrine drip, and benadryl prior to arrival at hospital. By time of arrival, shock resolved and epinephrine drip discontinued.   Plan:   -Intubated 9/5 for protection of airway.   -Methylprednisonone Q6H scheduled   -Diphenhydramine Q6H scheduled   -Famotidine 20mg Q12H

## 2023-09-05 NOTE — ASSESSMENT & PLAN NOTE
Characterized by hypotension with facial and tongue swelling at [place]  Treated with epinephrine, diphenhydramine prior to arrival at [facility]  Shock has resolved - epinephrine infusion discontinued  Methylprednisolone, 62.5 mg IV every 6 hours  Diphenhydramine, 25 mg IV every 6 hours  Famotidine, 20 mg IV every 12 hours

## 2023-09-05 NOTE — H&P
[facility] Internal Medicine History & Physical Note    Date of Service  9/5/2023    [facility] Team: [facility] ICU Gold Team   Attending: Abhinav Agee M.d.  Senior Resident: Dr. Josef Baig   Contact Number: [phone]    Primary Care Physician  No primary care provider on file.    Consultants  critical care    Specialist Names: Dr. Agee     Code Status  Full Code    Chief Complaint  Chief Complaint   Patient presents with    Sent by MD     Pt BIB Care-flight from [facility], pt was experiencing Anaphylaxis, received 3x epi pen, Solumedrol and 50mg Benadryl IV. Pt intubated in field, with 20mg of versed. An epi gtt was started but paused 5 minutes PTA.     Allergic Reaction       History of Presenting Illness (HPI):   Carlita Barker is a 43 y.o. female who presented 9/5/2023 via EMS from [facility] with anaphylaxis/anaphylactic shock. Per chart review, patient had facial and tongue swelling despite 3x EpiPen's and Benadryl and was intubated for airway protection and started on epinephrine drip prior to arrival to hospital. Anaphylaxis from unknown trigger. On arrival to ED patient hypertensive but O2 100% on ventilator. Labs were notable for  K 3.2, Glu 137, Hgb 11.9, and UDS positive for Benzodiazepines, cannabis, fentanyl, and cocaine. CXR noted ETT and nasogastric tube in place as well as mild lung scarring. She was then admitted for further treatment.     I discussed the plan of care with critical care.    Review of Systems  Review of Systems   Unable to perform ROS: Acuity of condition     Past Medical History   has no past medical history on file.    Surgical History   has no past surgical history on file.     Family History  family history is not on file.   Family history reviewed with patient.     Social History  Unable to perform due to acuity of condition and no hx on file.     Allergies  Not on File    Medications  None       Physical Exam  Pulse:  [62-79] 64  Resp:  [22-24] 24  BP: (118-182)/()  "150/104  SpO2:  [92 %-100 %] 100 %  Blood Pressure: (!) 150/104       Pulse: 64   Respiration: (!) 24   Pulse Oximetry: 100 %       Physical Exam  Vitals and nursing note reviewed.   Constitutional:       General: She is not in acute distress.     Appearance: She is ill-appearing.   HENT:      Mouth/Throat:      Comments: Mild tongue swelling. Lips appear normal.   Eyes:      Comments: Significant periorbital edema.    Cardiovascular:      Rate and Rhythm: Normal rate and regular rhythm.      Heart sounds: No murmur heard.     No friction rub. No gallop.   Pulmonary:      Effort: No respiratory distress.      Comments: intubated  Abdominal:      General: Abdomen is flat.      Palpations: Abdomen is soft.   Musculoskeletal:      Right lower leg: No edema.      Left lower leg: No edema.   Skin:     General: Skin is warm and dry.      Coloration: Skin is not jaundiced.      Findings: No bruising.   Neurological:      Comments: Sedated.          Laboratory:  Recent Labs     09/05/23  0626   WBC 9.7   RBC 3.64*   HEMOGLOBIN 11.9*   HEMATOCRIT 34.5*   MCV 94.8   MCH 32.7   MCHC 34.5   RDW 46.2   PLATELETCT 209   MPV 9.3     Recent Labs     09/05/23  0626   SODIUM 139   POTASSIUM 3.2*   CHLORIDE 107   CO2 22   GLUCOSE 137*   BUN 8   CREATININE 0.65   CALCIUM 7.7*     Recent Labs     09/05/23  0626   ALTSGPT 8   ASTSGOT 17   ALKPHOSPHAT 50   TBILIRUBIN 0.6   GLUCOSE 137*         No results for input(s): \"NTPROBNP\" in the last 72 hours.      No results for input(s): \"TROPONINT\" in the last 72 hours.    Imaging:  DX-CHEST-PORTABLE (1 VIEW)   Final Result      ETT and nasogastric tube appear adequate. Mild lung scarring          DX-CHEST-PORTABLE (1 VIEW)    Result Date: 9/5/2023  ETT and nasogastric tube appear adequate. Mild lung scarring       Assessment/Plan:  Problem Representation:   I anticipate this patient will require at least two midnights for appropriate medical management, necessitating inpatient admission because " of anaphylactic shock.      Patient will need a ICU (Adult and Pediatrics) bed on MEDICAL service .  The need is secondary to high risk of deterioration if worsening respiratory system dysfunction..    * Anaphylactic shock- (present on admission)  Assessment & Plan  -Sx of facial and tongue swelling at [facility]  -Treated with 3x epinephrine, epinephrine drip, and benadryl prior to arrival at hospital. By time of arrival, shock resolved and epinephrine drip discontinued.   Plan:   -Intubated 9/5 for protection of airway.   -Methylprednisonone Q6H scheduled   -Diphenhydramine Q6H scheduled   -Famotidine 20mg Q12H     Positive urine drug screen  Assessment & Plan  -Positive for benzodiazepines (administered by EMS), cocaine, and fentanyl.     Hypomagnesemia  Assessment & Plan  -1.9 on admission   -received 2IV in ED.   -recheck in AM     Hypokalemia  Assessment & Plan  -3.2 on admisison   -30 meq Kcl in ED   -recheck in AM     Acute respiratory failure with hypoxia (HCC)  Assessment & Plan  -secondary to anaphylactic shock   -intubated 9/5 at [facility]   -Admit to ICU       VTE prophylaxis: enoxaparin ppx

## 2023-09-05 NOTE — ED NOTES
Called lab to verify receipt of recollected labs, Sample handling confirmed receipt and labs being placed in process

## 2023-09-06 ENCOUNTER — APPOINTMENT (OUTPATIENT)
Dept: RADIOLOGY | Facility: MEDICAL CENTER | Age: 43
DRG: 915 | End: 2023-09-06
Attending: INTERNAL MEDICINE
Payer: COMMERCIAL

## 2023-09-06 PROBLEM — T78.09XA ANAPHYLACTIC REACTION DUE TO OTHER FOOD PRODUCTS, INITIAL ENCOUNTER: Status: ACTIVE | Noted: 2023-09-06

## 2023-09-06 LAB
ANION GAP SERPL CALC-SCNC: 11 MMOL/L (ref 7–16)
BASE EXCESS BLDA CALC-SCNC: -2 MMOL/L (ref -4–3)
BASOPHILS # BLD AUTO: 0.1 % (ref 0–1.8)
BASOPHILS # BLD: 0.01 K/UL (ref 0–0.12)
BODY TEMPERATURE: ABNORMAL DEGREES
BREATHS SETTING VENT: 18
BUN SERPL-MCNC: 18 MG/DL (ref 8–22)
CALCIUM SERPL-MCNC: 8.4 MG/DL (ref 8.5–10.5)
CHLORIDE SERPL-SCNC: 108 MMOL/L (ref 96–112)
CO2 BLDA-SCNC: 24 MMOL/L (ref 20–33)
CO2 SERPL-SCNC: 20 MMOL/L (ref 20–33)
CREAT SERPL-MCNC: 0.57 MG/DL (ref 0.5–1.4)
DELSYS IDSYS: ABNORMAL
END TIDAL CARBON DIOXIDE IECO2: 31 MMHG
EOSINOPHIL # BLD AUTO: 0 K/UL (ref 0–0.51)
EOSINOPHIL NFR BLD: 0 % (ref 0–6.9)
ERYTHROCYTE [DISTWIDTH] IN BLOOD BY AUTOMATED COUNT: 48.4 FL (ref 35.9–50)
GFR SERPLBLD CREATININE-BSD FMLA CKD-EPI: 117 ML/MIN/1.73 M 2
GLUCOSE SERPL-MCNC: 124 MG/DL (ref 65–99)
HCO3 BLDA-SCNC: 22.5 MMOL/L (ref 17–25)
HCT VFR BLD AUTO: 35.4 % (ref 37–47)
HGB BLD-MCNC: 11.8 G/DL (ref 12–16)
HOROWITZ INDEX BLDA+IHG-RTO: 437 MM[HG]
IMM GRANULOCYTES # BLD AUTO: 0.01 K/UL (ref 0–0.11)
IMM GRANULOCYTES NFR BLD AUTO: 0.1 % (ref 0–0.9)
LYMPHOCYTES # BLD AUTO: 0.93 K/UL (ref 1–4.8)
LYMPHOCYTES NFR BLD: 10.1 % (ref 22–41)
MAGNESIUM SERPL-MCNC: 2.4 MG/DL (ref 1.5–2.5)
MCH RBC QN AUTO: 32.2 PG (ref 27–33)
MCHC RBC AUTO-ENTMCNC: 33.3 G/DL (ref 32.2–35.5)
MCV RBC AUTO: 96.7 FL (ref 81.4–97.8)
MODE IMODE: ABNORMAL
MONOCYTES # BLD AUTO: 0.25 K/UL (ref 0–0.85)
MONOCYTES NFR BLD AUTO: 2.7 % (ref 0–13.4)
NEUTROPHILS # BLD AUTO: 7.97 K/UL (ref 1.82–7.42)
NEUTROPHILS NFR BLD: 87 % (ref 44–72)
NRBC # BLD AUTO: 0 K/UL
NRBC BLD-RTO: 0 /100 WBC (ref 0–0.2)
O2/TOTAL GAS SETTING VFR VENT: 30 %
PCO2 BLDA: 38.1 MMHG (ref 26–37)
PCO2 TEMP ADJ BLDA: 36.9 MMHG (ref 26–37)
PEEP END EXPIRATORY PRESSURE IPEEP: 8 CMH20
PH BLDA: 7.38 [PH] (ref 7.4–7.5)
PH TEMP ADJ BLDA: 7.39 [PH] (ref 7.4–7.5)
PHOSPHATE SERPL-MCNC: 3.8 MG/DL (ref 2.5–4.5)
PLATELET # BLD AUTO: 231 K/UL (ref 164–446)
PMV BLD AUTO: 9.5 FL (ref 9–12.9)
PO2 BLDA: 131 MMHG (ref 64–87)
PO2 TEMP ADJ BLDA: 126 MMHG (ref 64–87)
POTASSIUM SERPL-SCNC: 4.1 MMOL/L (ref 3.6–5.5)
RBC # BLD AUTO: 3.66 M/UL (ref 4.2–5.4)
SAO2 % BLDA: 99 % (ref 93–99)
SODIUM SERPL-SCNC: 139 MMOL/L (ref 135–145)
SPECIMEN DRAWN FROM PATIENT: ABNORMAL
TIDAL VOLUME IVT: 320 ML
WBC # BLD AUTO: 9.2 K/UL (ref 4.8–10.8)

## 2023-09-06 PROCEDURE — 80048 BASIC METABOLIC PNL TOTAL CA: CPT

## 2023-09-06 PROCEDURE — 99255 IP/OBS CONSLTJ NEW/EST HI 80: CPT | Performed by: HOSPITALIST

## 2023-09-06 PROCEDURE — 94150 VITAL CAPACITY TEST: CPT

## 2023-09-06 PROCEDURE — 700102 HCHG RX REV CODE 250 W/ 637 OVERRIDE(OP): Performed by: INTERNAL MEDICINE

## 2023-09-06 PROCEDURE — 85025 COMPLETE CBC W/AUTO DIFF WBC: CPT

## 2023-09-06 PROCEDURE — 94799 UNLISTED PULMONARY SVC/PX: CPT

## 2023-09-06 PROCEDURE — A9270 NON-COVERED ITEM OR SERVICE: HCPCS | Performed by: INTERNAL MEDICINE

## 2023-09-06 PROCEDURE — 83735 ASSAY OF MAGNESIUM: CPT

## 2023-09-06 PROCEDURE — 94003 VENT MGMT INPAT SUBQ DAY: CPT

## 2023-09-06 PROCEDURE — 92610 EVALUATE SWALLOWING FUNCTION: CPT

## 2023-09-06 PROCEDURE — 770006 HCHG ROOM/CARE - MED/SURG/GYN SEMI*

## 2023-09-06 PROCEDURE — A9270 NON-COVERED ITEM OR SERVICE: HCPCS | Performed by: NURSE PRACTITIONER

## 2023-09-06 PROCEDURE — 82803 BLOOD GASES ANY COMBINATION: CPT

## 2023-09-06 PROCEDURE — 700102 HCHG RX REV CODE 250 W/ 637 OVERRIDE(OP): Performed by: NURSE PRACTITIONER

## 2023-09-06 PROCEDURE — 36600 WITHDRAWAL OF ARTERIAL BLOOD: CPT

## 2023-09-06 PROCEDURE — 84100 ASSAY OF PHOSPHORUS: CPT

## 2023-09-06 PROCEDURE — 700111 HCHG RX REV CODE 636 W/ 250 OVERRIDE (IP): Performed by: INTERNAL MEDICINE

## 2023-09-06 PROCEDURE — 99291 CRITICAL CARE FIRST HOUR: CPT | Performed by: NURSE PRACTITIONER

## 2023-09-06 PROCEDURE — 71045 X-RAY EXAM CHEST 1 VIEW: CPT

## 2023-09-06 RX ORDER — AMOXICILLIN 250 MG
2 CAPSULE ORAL 2 TIMES DAILY
Status: DISCONTINUED | OUTPATIENT
Start: 2023-09-06 | End: 2023-09-07 | Stop reason: HOSPADM

## 2023-09-06 RX ORDER — ONDANSETRON 4 MG/1
4 TABLET, ORALLY DISINTEGRATING ORAL EVERY 4 HOURS PRN
Status: DISCONTINUED | OUTPATIENT
Start: 2023-09-06 | End: 2023-09-07 | Stop reason: HOSPADM

## 2023-09-06 RX ORDER — DIPHENHYDRAMINE HCL 25 MG
25 TABLET ORAL EVERY 8 HOURS PRN
Status: DISCONTINUED | OUTPATIENT
Start: 2023-09-06 | End: 2023-09-07 | Stop reason: HOSPADM

## 2023-09-06 RX ORDER — FAMOTIDINE 20 MG/1
20 TABLET, FILM COATED ORAL EVERY 12 HOURS
Status: DISCONTINUED | OUTPATIENT
Start: 2023-09-06 | End: 2023-09-07 | Stop reason: HOSPADM

## 2023-09-06 RX ORDER — LEVOTHYROXINE SODIUM 0.05 MG/1
50 TABLET ORAL
Status: DISCONTINUED | OUTPATIENT
Start: 2023-09-06 | End: 2023-09-07 | Stop reason: HOSPADM

## 2023-09-06 RX ORDER — POLYETHYLENE GLYCOL 3350 17 G/17G
1 POWDER, FOR SOLUTION ORAL
Status: DISCONTINUED | OUTPATIENT
Start: 2023-09-06 | End: 2023-09-07 | Stop reason: HOSPADM

## 2023-09-06 RX ORDER — PROMETHAZINE HYDROCHLORIDE 25 MG/1
12.5-25 TABLET ORAL EVERY 4 HOURS PRN
Status: DISCONTINUED | OUTPATIENT
Start: 2023-09-06 | End: 2023-09-07 | Stop reason: HOSPADM

## 2023-09-06 RX ORDER — NICOTINE 21 MG/24HR
21 PATCH, TRANSDERMAL 24 HOURS TRANSDERMAL
Status: DISCONTINUED | OUTPATIENT
Start: 2023-09-07 | End: 2023-09-07 | Stop reason: HOSPADM

## 2023-09-06 RX ORDER — BISACODYL 10 MG
10 SUPPOSITORY, RECTAL RECTAL
Status: DISCONTINUED | OUTPATIENT
Start: 2023-09-06 | End: 2023-09-07 | Stop reason: HOSPADM

## 2023-09-06 RX ADMIN — NICOTINE POLACRILEX 2 MG: 2 GUM, CHEWING BUCCAL at 22:37

## 2023-09-06 RX ADMIN — METHYLPREDNISOLONE SODIUM SUCCINATE 62.5 MG: 125 INJECTION, POWDER, FOR SOLUTION INTRAMUSCULAR; INTRAVENOUS at 06:08

## 2023-09-06 RX ADMIN — FAMOTIDINE 20 MG: 20 TABLET, FILM COATED ORAL at 06:08

## 2023-09-06 RX ADMIN — DIPHENHYDRAMINE HYDROCHLORIDE 25 MG: 50 INJECTION, SOLUTION INTRAMUSCULAR; INTRAVENOUS at 06:08

## 2023-09-06 RX ADMIN — SENNOSIDES AND DOCUSATE SODIUM 2 TABLET: 50; 8.6 TABLET ORAL at 06:08

## 2023-09-06 ASSESSMENT — ENCOUNTER SYMPTOMS
FEVER: 0
CHILLS: 0
MEMORY LOSS: 1
WHEEZING: 0

## 2023-09-06 ASSESSMENT — PAIN DESCRIPTION - PAIN TYPE
TYPE: ACUTE PAIN

## 2023-09-06 ASSESSMENT — LIFESTYLE VARIABLES: SUBSTANCE_ABUSE: 1

## 2023-09-06 ASSESSMENT — PULMONARY FUNCTION TESTS: FVC: 1.5

## 2023-09-06 NOTE — PROGRESS NOTES
"Mother and father updated by phone on patient status. 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 Since, she has had allergic reactions to unknown substances, but her per parents - she has no identified allergies.   "

## 2023-09-06 NOTE — FLOWSHEET NOTE
09/06/23 0708   Weaning Parameters   RR (bpm) 14   $ FVC / Vital Capacity (liters)  1.5   NIF (cm H2O)  -45   Rapid Shallow Breathing Index (RR/VT) 15   Spontaneous VE 7.5   Spontaneous

## 2023-09-06 NOTE — ASSESSMENT & PLAN NOTE
She required endotracheal intubation and mechanical ventilation with subsequent extubation on 9/6/2023 now to room air

## 2023-09-06 NOTE — PROGRESS NOTES
"Critical Care Progress Note    Date of admission  9/5/2023    Chief Complaint  43 y.o. female admitted 9/5/2023 with anaphylaxis    Hospital Course  Carlita Barker is a 43-year-old female without a significant past medical history who was transported from [facility] via EMS to [facility]note 2[facility] 9/5/2023 for facial and tongue swelling/anaphylaxis.  It was reported that she self-administered her own EpiPenS x3, and Benadryl without any improvement therefore she was evaluated at the clinic at [facility].  At the clinic she was diagnosed with anaphylaxis and given additional epinephrine, IV methylprednisolone, IV Benadryl and ultimately required intubation for airway protection.  An epinephrine infusion was initiated for hypotension prior to transport.  Upon arrival to [facility] she was found to be hypertensive, hypokalemic, UDS positive for benzo, cannabis, fentanyl, cocaine.  She was transferred to ICU for critical care management    Interval Problem Update  Reviewed last 24 hour events:    -No events overnight  -Required Precedex for anxiety- active titration   - Weaned off of precedex this am   -Vent day #2  - Great weaning parameters- (-48 NIF; RSBI 15, TV 1.5 L with cuf leak)   - extubate  Intubated however nods head to yes and no questions  Nod her head \"no \" to pain, nausea      Review of Systems  Review of Systems   Unable to perform ROS: Intubated        Vital Signs for last 24 hours   Temp:  [36.1 °C (97 °F)-36.7 °C (98.1 °F)] 36.2 °C (97.2 °F)  Pulse:  [45-77] 52  Resp:  [10-36] 10  BP: (108-157)/() 144/99  SpO2:  [96 %-100 %] 100 %    Hemodynamic parameters for last 24 hours       Respiratory Information for the last 24 hours  Vent Mode: Spont  Rate (breaths/min): 18  Vt Target (mL): 320  PEEP/CPAP: 8  P Support: 5  MAP: 9  Control VTE (exp VT): 324    Physical Exam   Physical Exam  Vitals and nursing note reviewed.   Constitutional:       General: She is awake.   HENT:      Head: " Normocephalic and atraumatic.   Eyes:      Extraocular Movements: Extraocular movements intact.      Pupils: Pupils are equal, round, and reactive to light.      Comments: Bl periorbital edema however pt able to open eyes   Cardiovascular:      Rate and Rhythm: Normal rate and regular rhythm.      Pulses:           Radial pulses are 2+ on the right side and 2+ on the left side.        Dorsalis pedis pulses are 2+ on the right side and 2+ on the left side.      Heart sounds: No murmur heard.  Pulmonary:      Comments: Intubated  Breath sounds clear throughout   Chest:   Breasts:     Breasts are symmetrical.   Abdominal:      General: Abdomen is flat. Bowel sounds are normal.      Palpations: Abdomen is soft.   Genitourinary:     Comments: Mendez cath to down drain- clear yellow urine  Musculoskeletal:         General: Normal range of motion.      Cervical back: Normal range of motion.      Right lower leg: No edema.      Left lower leg: No edema.   Skin:     General: Skin is warm and dry.      Capillary Refill: Capillary refill takes less than 2 seconds.   Neurological:      Mental Status: She is alert.      Comments: Awake and alert, intubated  BRUCE 3-4,mm   MAEE and strong  Following commands   Nods head appropriately to questions   Psychiatric:         Behavior: Behavior is cooperative.      Comments: Awake- intubated         Medications  Current Facility-Administered Medications   Medication Dose Route Frequency Provider Last Rate Last Admin    lactated ringers infusion   Intravenous Continuous Abhinav Agee M.D. 125 mL/hr at 09/05/23 2325 New Bag at 09/05/23 2325    enoxaparin (Lovenox) inj 40 mg  40 mg Subcutaneous DAILY AT 1800 Abhinav Agee M.D.   40 mg at 09/05/23 1722    ondansetron (Zofran) syringe/vial injection 4 mg  4 mg Intravenous Q4HRS PRN Abhinav Agee M.D.        promethazine (Phenergan) suppository 12.5-25 mg  12.5-25 mg Rectal Q4HRS PRN Abhinav Agee M.D.         prochlorperazine (Compazine) injection 5-10 mg  5-10 mg Intravenous Q4HRS PRN Abhinav Agee M.D.        Respiratory Therapy Consult   Nebulization Continuous RT Abhinav Agee M.D.        famotidine (Pepcid) tablet 20 mg  20 mg Enteral Tube Q12HRS Abhinav Agee M.D.   20 mg at 09/06/23 0608    Or    famotidine (Pepcid) injection 20 mg  20 mg Intravenous Q12HRS Abhinav Agee M.D.   20 mg at 09/05/23 1725    MD Alert...ICU Electrolyte Replacement per Pharmacy   Other PHARMACY TO DOSE Abhinav Agee M.D.        lidocaine (Xylocaine) 1 % injection 2 mL  2 mL Tracheal Tube Q30 MIN PRN Abhinav Agee M.D.        dexmedetomidine (PRECEDEX) 400 mcg/100mL NS premix infusion  0-1.5 mcg/kg/hr (Ideal) Intravenous Continuous Abhinav Agee M.D. 1.3 mL/hr at 09/05/23 2125 0.1 mcg/kg/hr at 09/05/23 2125    ipratropium-albuterol (DUONEB) nebulizer solution  3 mL Nebulization Q2HRS PRN (RT) Abhinav Agee M.D.        HYDROmorphone (Dilaudid) injection 0.5-2 mg  0.5-2 mg Intravenous Q HOUR PRN Abhinav Agee M.D.        methylPREDNISolone sod succ (SOLU-MEDROL) 125 MG injection 62.5 mg  62.5 mg Intravenous Q6HRS Abhinav Agee M.D.   62.5 mg at 09/06/23 0608    diphenhydrAMINE (Benadryl) injection 25 mg  25 mg Intravenous Q6HRS Abhinav Agee M.D.   25 mg at 09/06/23 0608    Pharmacy Consult: Enteral tube insertion - review meds/change route/product selection   Other PHARMACY TO DOSE Abhinav Agee M.D.        promethazine (Phenergan) tablet 12.5-25 mg  12.5-25 mg Enteral Tube Q4HRS PRN Abhinav Agee M.D.        ondansetron (Zofran ODT) dispertab 4 mg  4 mg Enteral Tube Q4HRS PRN Abhinav Agee M.D.        senna-docusate (Pericolace Or Senokot S) 8.6-50 MG per tablet 2 Tablet  2 Tablet Enteral Tube BID Abhinav Agee M.D.   2 Tablet at 09/06/23 0608    And    polyethylene glycol/lytes (Miralax) PACKET 1  Packet  1 Packet Enteral Tube QDAY PRN Abhinav Agee M.D.        And    magnesium hydroxide (Milk Of Magnesia) suspension 30 mL  30 mL Enteral Tube QDAY PRN Abhinav Agee M.D.        And    bisacodyl (Dulcolax) suppository 10 mg  10 mg Rectal QDAY PRN Abhinav Agee M.D.        propofol (DIPRIVAN) injection  0-80 mcg/kg/min (Ideal) Intravenous Continuous Abhinav Agee M.D.   Held at 09/05/23 1000    hydrALAZINE (Apresoline) injection 20 mg  20 mg Intravenous Q6HRS PRN Abhinav Agee M.D.        labetalol (Normodyne/Trandate) injection 10-20 mg  10-20 mg Intravenous Q4HRS PRN Abhinav Agee M.D.           Fluids    Intake/Output Summary (Last 24 hours) at 9/6/2023 0617  Last data filed at 9/6/2023 0400  Gross per 24 hour   Intake 3174.57 ml   Output 2955 ml   Net 219.57 ml       Laboratory  Recent Labs     09/05/23  0555 09/05/23  1012 09/06/23  0437   ISTATAPH 7.353* 7.464 7.380*   ISTATAPCO2 42.9* 29.4 38.1*   ISTATAPO2 106* 130* 131*   ISTATATCO2 25 22 24   SZXFGWB9UAO 98 99 99   ISTATARTHCO3 23.8 21.1 22.5   ISTATARTBE -2 -2 -2   ISTATTEMP 36.1 C 96.8 F 97.3 F   ISTATFIO2 30 30 30   ISTATSPEC Arterial Arterial Arterial   ISTATAPHTC 7.366* 7.480 7.390*   [id number] 100* 124* 126*         Recent Labs     09/05/23  0626   SODIUM 139   POTASSIUM 3.2*   CHLORIDE 107   CO2 22   BUN 8   CREATININE 0.65   MAGNESIUM 1.9   PHOSPHORUS 2.5   CALCIUM 7.7*     Recent Labs     09/05/23  0626   ALTSGPT 8   ASTSGOT 17   ALKPHOSPHAT 50   TBILIRUBIN 0.6   GLUCOSE 137*     Recent Labs     09/05/23  0626 09/06/23  0400   WBC 9.7 9.2   NEUTSPOLYS 95.70* 87.00*   LYMPHOCYTES 3.20* 10.10*   MONOCYTES 0.70 2.70   EOSINOPHILS 0.00 0.00   BASOPHILS 0.10 0.10   ASTSGOT 17  --    ALTSGPT 8  --    ALKPHOSPHAT 50  --    TBILIRUBIN 0.6  --      Recent Labs     09/05/23  0626 09/06/23  0400   RBC 3.64* 3.66*   HEMOGLOBIN 11.9* 11.8*   HEMATOCRIT 34.5* 35.4*   PLATELETCT 209 231  "      Imaging  X-Ray:  I have personally reviewed the images and compared with prior images.    Assessment/Plan  * Anaphylactic shock- (present on admission)  Assessment & Plan  Characterized by hypotension with facial and tongue swelling at [facility]  Treated with epinephrine, diphenhydramine prior to arrival at [facility]  Shock has resolved - epinephrine infusion discontinued  Methylprednisolone, 62.5 mg IV every 6 hours  Diphenhydramine, 25 mg IV every 6 hours  Famotidine, 20 mg IV every 12 hours    Positive urine drug screen  Assessment & Plan  Positive urine drug screen for benzodiazepines (which was administered by health care providers prior to urine specimen obtained), cocaine and fentanyl  Unclear if she intended to take fentanyl or if it was \"slipped\" to her    Cocaine abuse (HCC)  Assessment & Plan  Positive urine drug screen for cocaine  Cessation counseling when clinically appropriate    Hypomagnesemia  Assessment & Plan  Replete magnesium  resoved    Hypokalemia  Assessment & Plan    BMP/mag/Phos in a.m.  Replete as needed    Acute respiratory failure with hypoxia (HCC)  Assessment & Plan  Intubated 9/5 at [facility] for airway protection  -Titrate FiO2 to keep sats greater than 90%   -Titrate ventilator prescription to optimize acid-base balance, oxygenation and ventilation  -HOB > 30 degrees and peridex for VAP prevention  -daily SBT  -Pepcid for GI prophylaxis  -SAT/SBT when able (ABCDEF Bundle)  -Early mobility  -SCDs and Lovenox  for DVT prophylaxis  -Pepcid, chlorhexidine  -Repeat chest xray and ABG PRN   -Titration of ventilator therapy based on ABGs and patient's status  -RT/O2 protocols  -Daily and PRN ABGs  -Sedation as tolerated/indicated             VTE:  Lovenox  Ulcer: H2 Antagonist  Lines: Mendez Catheter  Ongoing indication addressed    I have performed a physical exam and reviewed and updated ROS and Plan today (9/6/2023). In review of yesterday's note (9/5/2023), there are no changes " except as documented above.     Discussed patient condition and risk of morbidity and/or mortality with Hospitalist, Family, RN, RT, Therapies, Pharmacy, Dietary, Charge nurse / hot rounds, and Patient  The patient remains critically ill.  Critical care time = 55 minutes in directly providing and coordinating critical care and extensive data review.  No time overlap and excludes procedures.  Please note that this dictation was created using voice recognition software. The accuracy of the dictation is limited to the abilities of the software. I have made every reasonable attempt to correct obvious errors, but I expect that there are errors of grammar and possibly content that I did not discover before finalizing the note.

## 2023-09-06 NOTE — PROGRESS NOTES
Pt extubated  without any respiratory distress.    Pt no longer requires critical care management and will be transferred to the floor. Discussed with my attending, Dr. Puga and the Hospitalist team who will assume care. Please contact Critical Care service for any questions and or concerns

## 2023-09-06 NOTE — THERAPY
"Speech Language Pathology   Clinical Swallow Evaluation     Patient Name: Carlita Barker  AGE:  43 y.o., SEX:  female  Medical Record #: [id number]  Date of Service: 9/6/2023      History of Present Illness    44 y/o admitted on 9/5 for anaphylaxis /facial and tongue swelling. Intubated x1 day. Not seen by SLP before at [facility].    Dx chest 9/6: \"No acute cardiopulmonary disease.\"        General Information:  Vitals  O2 (LPM): 2  O2 Delivery Device: Silicone Nasal Cannula  Level of Consciousness: Alert  Orientation: Oriented x 4  Follows Directives: Yes      Prior Living Situation & Level of Function:  Prior Services: Home-Independent  Communication: WFL  Swallowing: WFL       Oral Mechanism Evaluation:  Dentition: Good   Facial Symmetry: Equal  Labial Observations: WFL   Lingual Observations: Midline  Motor Speech: WFL            Laryngeal Function:  Secretion Management: Adequate  Voice Quality: Hoarse      Assessment  Current Method of Nutrition: NPO until cleared by speech pathology  Positioning: Costa's (60-90 degrees)  Bolus Administration: Patient  O2 (LPM): 2 O2 Delivery Device: Silicone Nasal Cannula  Factor(s) Affecting Performance: None         Swallowing Trials:  Swallowing Trials  Thin Liquid (TN0): WFL  Pureed (PU4): WFL  Regular (RG7): WFL      Comments: No overt s/sx of aspiration noted with trials of thin liquids via cup sip, straw sip, and consecutive straw sip, puree, or solids. Pt continues to have facial swelling but fed self independently without issue. Vocal quality clear following the swallow. She denied globus sensation or odynophagia.       Clinical Impressions  Pt is presenting with a functional oropharyngeal swallow. Recommend initiation of a regular/thin liquid diet.    Recommendations  Diet Consistency: regular/thin liquids  Instrumentation: None indicated at this time  Medication: As tolerated  Supervision: Independent  Positioning: Fully upright and midline during oral intake  Risk " "Management : None  Oral Care: BID         SLP Treatment Plan  Treatment Plan: Dysphagia Treatment  SLP Frequency: 2x Per Week  Estimated Duration: Until Therapy Goals Met      Anticipated Discharge Needs  Discharge Recommendations: Anticipate that the patient will have no further speech therapy needs after discharge from the hospital   Therapy Recommendations Upon DC: Not Indicated        Patient / Family Goals  Patient / Family Goal #1: \"food that was brought in by my friends\"  Short Term Goals  Short Term Goal # 1: Pt will consume a regular/thin liquid diet with no overt s/sx of aspiration      JULIET Abreu   "

## 2023-09-06 NOTE — CARE PLAN
The patient is Watcher - Medium risk of patient condition declining or worsening    Shift Goals  Clinical Goals: SBP under 160, SAT AM  Patient Goals: VIN  Family Goals: VIN    Progress made toward(s) clinical / shift goals:    Patient's SBP remains at goal under 160 mmHg. Q4H neuro checks performed. Patient able to communicate needs with written communication. Patient remains free from injury r/t medical restraints. Patient educated on plan of care.    Problem: Knowledge Deficit - Standard  Goal: Patient and family/care givers will demonstrate understanding of plan of care, disease process/condition, diagnostic tests and medications  9/6/2023 0316 by Beti Grayson R.N.  Outcome: Progressing  9/6/2023 0315 by Beti Grayson R.N.  Outcome: Progressing     Problem: Fall Risk  Goal: Patient will remain free from falls  9/6/2023 0316 by Beti Grayson R.N.  Outcome: Progressing  9/6/2023 0315 by MAGGIE MinorN.  Outcome: Progressing     Problem: Communication  Goal: The ability to communicate needs accurately and effectively will improve  9/6/2023 0316 by Beti Grayson R.N.  Outcome: Progressing  9/6/2023 0315 by Beti Grayson R.N.  Outcome: Progressing     Problem: Safety - Medical Restraint  Goal: Remains free of injury from restraints (Restraint for Interference with Medical Device)  Outcome: Progressing

## 2023-09-06 NOTE — ASSESSMENT & PLAN NOTE
In the setting of multiple prior episodes of hives as well as 3 prior episodes of facial swelling requiring epinephrine.  This is her first true episode of anaphylaxis for which she required intubation.  The trigger is unknown.  She will be monitored off of the Benadryl and off of steroids for a recurrence.  Upon discharge she will prescribe an EpiPen and encouraged for close follow-up with an allergist.

## 2023-09-06 NOTE — RESPIRATORY CARE
Extubation    Cuff leak noted yes    Stridor present no     FiO2%: 30 % (09/06/23 0612)        Patient toleration well, recovering on 2L NC  RCP Complete? Yes    Events/Summary/Plan: extubation (09/06/23 0708)

## 2023-09-06 NOTE — CARE PLAN
The patient is Watcher - Medium risk of patient condition declining or worsening    Shift Goals  Clinical Goals: Decreased airway edema; SAT/SBT; SBP <160; decrease agitation  Patient Goals: VIN  Family Goals: VIN    Progress made toward(s) clinical / shift goals:  SAT performed. SBP <160 without pharm intervention. Decreased agitation with verbal reassurance/frequent reorientation and precedex.     Patient is not progressing towards the following goals:      Problem: Mobility  Goal: Patient's capacity to carry out activities will improve  Outcome: Not Progressing       Problem: Urinary Elimination  Goal: Establish and maintain regular urinary output  Outcome: Progressing     Problem: Pain - Standard  Goal: Alleviation of pain or a reduction in pain to the patient’s comfort goal  Outcome: Progressing

## 2023-09-06 NOTE — CARE PLAN
Problem: Ventilation  Goal: Ability to achieve and maintain unassisted ventilation or tolerate decreased levels of ventilator support  Description: Target End Date:  4 days     Document on Vent flowsheet    1.  Support and monitor invasive and noninvasive mechanical ventilation  2.  Monitor ventilator weaning response  3.  Perform ventilator associated pneumonia prevention interventions  4.  Manage ventilation therapy by monitoring diagnostic test results  Outcome: Progressing  Vent day 2  ETT size: 7 at 21  Vent Settings: Apvcmv 18/320/8/30  Plan: Continue current vent settings and wean as tolerated per physician orders

## 2023-09-06 NOTE — CONSULTS
Hospital Medicine Consultation    Date of Service  9/6/2023    Referring Physician  Jostin Puga M.D.    Consulting Physician  Shaquille Gar M.D.    Reason for Consultation  Anaphylaxis    History of Presenting Illness  43 y.o. female who presented 9/5/2023 with facial swelling.  Ms. Barker has a past medical history of hypothyroidism as well as a long history of allergies causing hives and 3 previous episodes of facial swelling in the past for which she is required epinephrine by paramedics though no prior history of true anaphylaxis.  [place] the [employer] festival and developed facial and tongue swelling.  She presented to the medical facility out there and was given 3 rounds of epi pens as well as Benadryl and required intubation.  She was flown here and epinephrine drip.  She was admitted to the intensive care unit on mechanical ventilation.  She has been treated with IV Benadryl, Pepcid, and IV Solu-Medrol.  Upon questioning previous episodes of hives has usually been due to fruits specifically kiwi, amanda, dry berries, apples and she is also had troubles with alcohol in the past.    Review of Systems  Review of Systems   Constitutional:  Negative for chills and fever.   Respiratory:  Negative for wheezing.    Cardiovascular:  Negative for chest pain.   Psychiatric/Behavioral:  Positive for memory loss and substance abuse.    All other systems reviewed and are negative.      Past Medical History  Hypothyroidism     Surgical History  none    Family History  She is adopted    Social History   Lives in [facility]  No routine illicit drugs    Medications  Synthroid 50 mcg       Allergies  Not on File    Physical Exam  Temp:  [36.1 °C (97 °F)-36.7 °C (98.1 °F)] 36.4 °C (97.5 °F)  Pulse:  [45-77] 52  Resp:  [10-36] 10  BP: (108-157)/() 132/84  SpO2:  [96 %-100 %] 100 %    Physical Exam  Vitals and nursing note reviewed.   Constitutional:       General: She is not in acute distress.     Appearance:  She is not toxic-appearing.   HENT:      Head: Atraumatic.   Cardiovascular:      Rate and Rhythm: Normal rate and regular rhythm.      Heart sounds: No murmur heard.  Pulmonary:      Effort: Pulmonary effort is normal.      Breath sounds: Normal breath sounds.   Abdominal:      General: There is no distension.      Tenderness: There is no abdominal tenderness.   Musculoskeletal:      Right lower leg: No edema.      Left lower leg: No edema.   Skin:     General: Skin is dry.   Neurological:      General: No focal deficit present.      Mental Status: She is alert and oriented to person, place, and time.   Psychiatric:         Mood and Affect: Mood normal.         Thought Content: Thought content normal.         Judgment: Judgment normal.         Fluids      Laboratory  Recent Labs     09/05/23  0626 09/06/23  0400   WBC 9.7 9.2   RBC 3.64* 3.66*   HEMOGLOBIN 11.9* 11.8*   HEMATOCRIT 34.5* 35.4*   MCV 94.8 96.7   MCH 32.7 32.2   MCHC 34.5 33.3   RDW 46.2 48.4   PLATELETCT 209 231   MPV 9.3 9.5     Recent Labs     09/05/23 0626 09/06/23  0400   SODIUM 139 139   POTASSIUM 3.2* 4.1   CHLORIDE 107 108   CO2 22 20   GLUCOSE 137* 124*   BUN 8 18   CREATININE 0.65 0.57   CALCIUM 7.7* 8.4*                     Imaging  DX-CHEST-PORTABLE (1 VIEW)   Final Result         1.  No acute cardiopulmonary disease.      DX-CHEST-PORTABLE (1 VIEW)   Final Result      ETT and nasogastric tube appear adequate. Mild lung scarring          Assessment/Plan  * Anaphylactic shock- (present on admission)  Assessment & Plan  In the setting of multiple prior episodes of hives as well as 3 prior episodes of facial swelling requiring epinephrine.  This is her first true episode of anaphylaxis for which she required intubation.  The trigger is unknown.  She will be monitored off of the Benadryl and off of steroids for a recurrence.  Upon discharge she will prescribe an EpiPen and encouraged for close follow-up with an allergist.    Acute respiratory  failure with hypoxia (HCC)- (present on admission)  Assessment & Plan  She required endotracheal intubation and mechanical ventilation with subsequent extubation on 9/6/2023 now to room air    Cocaine abuse (HCC)- (present on admission)  Assessment & Plan  Toxicology screen is positive    Hypomagnesemia- (present on admission)  Assessment & Plan  Replacement given    Hypokalemia- (present on admission)  Assessment & Plan  Replacement was given

## 2023-09-07 ENCOUNTER — PHARMACY VISIT (OUTPATIENT)
Dept: PHARMACY | Facility: MEDICAL CENTER | Age: 43
End: 2023-09-07
Payer: COMMERCIAL

## 2023-09-07 VITALS
TEMPERATURE: 97.4 F | DIASTOLIC BLOOD PRESSURE: 85 MMHG | HEART RATE: 65 BPM | RESPIRATION RATE: 17 BRPM | BODY MASS INDEX: 16.45 KG/M2 | HEIGHT: 63 IN | SYSTOLIC BLOOD PRESSURE: 143 MMHG | WEIGHT: 92.81 LBS | OXYGEN SATURATION: 98 %

## 2023-09-07 PROCEDURE — 700102 HCHG RX REV CODE 250 W/ 637 OVERRIDE(OP): Performed by: NURSE PRACTITIONER

## 2023-09-07 PROCEDURE — A9270 NON-COVERED ITEM OR SERVICE: HCPCS | Performed by: NURSE PRACTITIONER

## 2023-09-07 PROCEDURE — 99239 HOSP IP/OBS DSCHRG MGMT >30: CPT | Performed by: HOSPITALIST

## 2023-09-07 PROCEDURE — 700102 HCHG RX REV CODE 250 W/ 637 OVERRIDE(OP): Performed by: HOSPITALIST

## 2023-09-07 PROCEDURE — A9270 NON-COVERED ITEM OR SERVICE: HCPCS | Performed by: HOSPITALIST

## 2023-09-07 PROCEDURE — RXMED WILLOW AMBULATORY MEDICATION CHARGE: Performed by: HOSPITALIST

## 2023-09-07 RX ORDER — EPINEPHRINE 0.3 MG/.3ML
INJECTION SUBCUTANEOUS
Qty: 2 EACH | Refills: 2 | Status: SHIPPED | OUTPATIENT
Start: 2023-09-07

## 2023-09-07 RX ADMIN — FAMOTIDINE 20 MG: 20 TABLET ORAL at 05:57

## 2023-09-07 RX ADMIN — NICOTINE TRANSDERMAL SYSTEM 21 MG: 21 PATCH, EXTENDED RELEASE TRANSDERMAL at 05:57

## 2023-09-07 RX ADMIN — LEVOTHYROXINE SODIUM 50 MCG: 0.05 TABLET ORAL at 05:57

## 2023-09-07 NOTE — DISCHARGE INSTRUCTIONS
Epipen as directed  Please follow up with your primary doctor in [place] and arrange for an appointment with an allergist.

## 2023-09-07 NOTE — DISCHARGE SUMMARY
Discharge Summary    CHIEF COMPLAINT ON ADMISSION  Chief Complaint   Patient presents with    Sent by [facility]flight from [facility], pt was experiencing Anaphylaxis, received 3x epi pen, Solumedrol and 50mg Benadryl IV. Pt intubated in field, with 20mg of versed. An epi gtt was started but paused 5 minutes PTA.     Allergic Reaction       Reason for Admission  Transfer from [facility] Anaphyla*     Admission Date  9/5/2023    CODE STATUS  Full Code    HPI & HOSPITAL COURSE  This is a 43 y.o. female here with anaphylaxis   Ms. Barker has a past medical history of hypothyroidism as well as a long history of allergies causing hives and 3 previous episodes of facial swelling in the past for which she is required epinephrine by paramedics though no prior history of true anaphylaxis.  She was out at the [facility] festival and developed facial and tongue swelling.  She presented to the medical facility out there and was given 3 rounds of epi pens as well as Benadryl and required intubation.  She was flown here and epinephrine drip.  She was admitted to the intensive care unit on mechanical ventilation.  She has been treated with IV Benadryl, Pepcid, and IV Solu-Medrol.  Upon questioning previous episodes of hives has usually been due to fruits specifically kiwi, amanda, dry berries, apples and she is also had troubles with alcohol in the past.    9/7: Ms. Barker remains on room air. She ambulating without assistance. She is tolerating a regular diet. I have prescribed an Epipen for discharge and have recommended follow up with an allergist.     Therefore, she is discharged in good and stable condition to home with close outpatient follow-up.    The patient met 2-midnight criteria for an inpatient stay at the time of discharge.    Discharge Date  9/7    FOLLOW UP ITEMS POST DISCHARGE  Allergist     DISCHARGE DIAGNOSES  Principal Problem:    Anaphylactic shock (POA: Yes)  Active Problems:    Acute respiratory  failure with hypoxia (HCC) (POA: Yes)    Hypokalemia (POA: Yes)    Hypomagnesemia (POA: Yes)    Cocaine abuse (HCC) (POA: Yes)    Positive urine drug screen (POA: Unknown)    Anaphylactic reaction due to other food products, initial encounter (POA: Yes)  Resolved Problems:    * No resolved hospital problems. *      FOLLOW UP  No future appointments.  No follow-up provider specified.    MEDICATIONS ON DISCHARGE     Medication List        START taking these medications        Instructions   EPINEPHrine 0.3 MG/0.3ML Sosy   Inject the contents of the epipen into the thigh, hold for 3 seconds and release from thigh as needed for anaphylaxis.            Synthroid 50 mcg daily  Allergies  Not on File    DIET  Orders Placed This Encounter   Procedures    Diet Order Diet: Regular     Standing Status:   Standing     Number of Occurrences:   1     Order Specific Question:   Diet:     Answer:   Regular [1]       ACTIVITY  As tolerated.      CONSULTATIONS  Critical care    PROCEDURES  none    LABORATORY  Lab Results   Component Value Date    SODIUM 139 09/06/2023    POTASSIUM 4.1 09/06/2023    CHLORIDE 108 09/06/2023    CO2 20 09/06/2023    GLUCOSE 124 (H) 09/06/2023    BUN 18 09/06/2023    CREATININE 0.57 09/06/2023        Lab Results   Component Value Date    WBC 9.2 09/06/2023    HEMOGLOBIN 11.8 (L) 09/06/2023    HEMATOCRIT 35.4 (L) 09/06/2023    PLATELETCT 231 09/06/2023      DX-CHEST-PORTABLE (1 VIEW)   Final Result         1.  No acute cardiopulmonary disease.      DX-CHEST-PORTABLE (1 VIEW)   Final Result      ETT and nasogastric tube appear adequate. Mild lung scarring            Total time of the discharge process exceeds 32 minutes.

## 2023-09-07 NOTE — PROGRESS NOTES
Pt to arrive to room, states she is comfortable at this time with no needs other than being hungry, denies pain at this time. Family at bedside, pt and family state they are planning on discharging home tomorrow and hope to discharge early. Pt having snack at this time. Pt has stuffy nose, states it continues, kleenex provided. Pt continues with watery swollen eyes, tearfulness, does not want to speak to tearfulness, family states unknown reason and that she has been tearful.

## 2023-09-07 NOTE — CARE PLAN
Problem: Fall Risk  Goal: Patient will remain free from falls  Outcome: Progressing     The patient is Stable - Low risk of patient condition declining or worsening    Shift Goals  Clinical Goals: safety  Patient Goals: Go home  Family Goals: go home    Progress made toward(s) clinical / shift goals:  patient with steady gait, but is weak. Educated on risk of fall.     Patient is not progressing towards the following goals:

## 2023-09-07 NOTE — CARE PLAN
The patient is Stable - Low risk of patient condition declining or worsening    Shift Goals  Clinical Goals: maintain o2 sats  Patient Goals: Go home  Family Goals: go home    Progress made toward(s) clinical / shift goals:       Problem: Knowledge Deficit - Standard  Goal: Patient and family/care givers will demonstrate understanding of plan of care, disease process/condition, diagnostic tests and medications  Outcome: Progressing     Problem: Fall Risk  Goal: Patient will remain free from falls  Outcome: Progressing     Problem: Psychosocial  Goal: Patient's level of anxiety will decrease  Outcome: Progressing  Goal: Patient's ability to verbalize feelings about condition will improve  Outcome: Progressing  Goal: Patient's ability to re-evaluate and adapt role responsibilities will improve  Outcome: Progressing  Goal: Patient and family will demonstrate ability to cope with life altering diagnosis and/or procedure  Outcome: Progressing  Goal: Spiritual and cultural needs incorporated into hospitalization  Outcome: Progressing     Problem: Communication  Goal: The ability to communicate needs accurately and effectively will improve  Outcome: Progressing     Problem: Discharge Barriers/Planning  Goal: Patient's continuum of care needs are met  Outcome: Progressing     Problem: Hemodynamics  Goal: Patient's hemodynamics, fluid balance and neurologic status will be stable or improve  Outcome: Progressing     Problem: Respiratory  Goal: Patient will achieve/maintain optimum respiratory ventilation and gas exchange  Outcome: Progressing     Problem: Chest Tube Management  Goal: Complications related to chest tube will be avoided or minimized  Outcome: Progressing     Problem: Fluid Volume  Goal: Fluid volume balance will be maintained  Outcome: Progressing     Problem: Mechanical Ventilation  Goal: Safe management of artificial airway and ventilation  Outcome: Progressing  Goal: Successful weaning off mechanical  ventilator, spontaneously maintains adequate gas exchange  Outcome: Progressing  Goal: Patient will be able to express needs and understand communication  Outcome: Progressing     Problem: Dysphagia  Goal: Dysphagia will improve  Outcome: Progressing     Problem: Risk for Aspiration  Goal: Patient's risk for aspiration will be absent or decrease  Outcome: Progressing     Problem: Nutrition  Goal: Patient's nutritional and fluid intake will be adequate or improve  Outcome: Progressing  Goal: Enteral nutrition will be maintained or improve  Outcome: Progressing  Goal: Enteral nutrition will be maintained or improve  Outcome: Progressing     Problem: Urinary Elimination  Goal: Establish and maintain regular urinary output  Outcome: Progressing     Problem: Bowel Elimination  Goal: Establish and maintain regular bowel function  Outcome: Progressing     Problem: Gastrointestinal Irritability  Goal: Nausea and vomiting will be absent or improve  Outcome: Progressing  Goal: Diarrhea will be absent or improved  Outcome: Progressing     Problem: Rectal Tube  Goal: Fecal output will be contained and skin will remain free from irritation  Outcome: Progressing     Problem: Mobility  Goal: Patient's capacity to carry out activities will improve  Outcome: Progressing     Problem: Self Care  Goal: Patient will have the ability to perform ADLs independently or with assistance (bathe, groom, dress, toilet and feed)  Outcome: Progressing     Problem: Infection - Standard  Goal: Patient will remain free from infection  Outcome: Progressing     Problem: Wound/ / Incision Healing  Goal: Patient's wound/surgical incision will decrease in size and heals properly  Outcome: Progressing     Problem: Pain - Standard  Goal: Alleviation of pain or a reduction in pain to the patient’s comfort goal  Outcome: Progressing     Problem: Skin Integrity  Goal: Skin integrity is maintained or improved  Outcome: Progressing     Problem: Safety - Medical  Restraint  Goal: Remains free of injury from restraints (Restraint for Interference with Medical Device)  Outcome: Progressing  Goal: Free from restraint(s) (Restraint for Interference with Medical Device)  Outcome: Progressing       Patient is not progressing towards the following goals:

## 2023-09-07 NOTE — PROGRESS NOTES
Received report and assumed care at shift change. A&O x 4 , tearful but cooperative with cares. Fall precautions in place, bed locked and in lowest position. Needs attended to. No complains of pain

## 2023-09-07 NOTE — PROGRESS NOTES
4 Eyes Skin Assessment Completed by JC Iverson and JC Barraza.    Head swelling bilateral eyelids  Ears Blanching  Nose WDL  Mouth WDL  Neck WDL  Breast/Chest Edema  Shoulder Blades WDL  Spine WDL  (R) Arm/Elbow/Hand WDL  (L) Arm/Elbow/Hand WDL  Abdomen WDL  Groin WDL  Scrotum/Coccyx/Buttocks WDL  (R) Leg WDL  (L) Leg WDL  (R) Heel/Foot/Toe WDL  (L) Heel/Foot/Toe WDL          Devices In Places N/A      Interventions In Place Pillows    Possible Skin Injury No    Pictures Uploaded Into Epic N/A  Wound Consult Placed N/A  RN Wound Prevention Protocol Ordered No

## 2023-09-07 NOTE — PROGRESS NOTES
Pt states feeling that she is ready to DC home today. Care team aware of pt having intermittent tearfulness at hospital, pt states having a plan to follow up outpatient for sadness felt. Plan for pt to DC home with epi pen, pharmacy currently working on getting pen and knows of pt insurance, communication with hospitalist to ensure pt has needs met prior to DC with [facility] in [place], pharmacy to state she has to get pen filled there, unable to get r/t insurance. Hospitalist notified.

## 2023-09-07 NOTE — PROGRESS NOTES
RN to provide one on one with pt to have tearfulness, pt with limited talking and continues with tearfulness, answers appropriately, states she is depressed and would like ketamine for depression, states she took before and that she would prefer this medication, took before. Accepted warm washcloth as intervention to assist, use of kleenex, does not want anything further at this time, family still at bedside, family to share with this RN in private pt  has been tearful because a lot has happened and she recently went through a divorce against her wishes.